# Patient Record
Sex: FEMALE | Race: OTHER | NOT HISPANIC OR LATINO | URBAN - METROPOLITAN AREA
[De-identification: names, ages, dates, MRNs, and addresses within clinical notes are randomized per-mention and may not be internally consistent; named-entity substitution may affect disease eponyms.]

---

## 2023-04-20 VITALS
RESPIRATION RATE: 16 BRPM | OXYGEN SATURATION: 96 % | SYSTOLIC BLOOD PRESSURE: 131 MMHG | HEART RATE: 61 BPM | WEIGHT: 216.05 LBS | TEMPERATURE: 97 F | HEIGHT: 65 IN | DIASTOLIC BLOOD PRESSURE: 80 MMHG

## 2023-04-20 RX ORDER — POVIDONE-IODINE 5 %
1 AEROSOL (ML) TOPICAL ONCE
Refills: 0 | Status: COMPLETED | OUTPATIENT
Start: 2023-04-21 | End: 2023-04-21

## 2023-04-20 NOTE — H&P ADULT - PROBLEM SELECTOR PLAN 1
Admit to Orthopaedic Service.  Presents today for elective right partial knee replacement   Pt medically stable and cleared for procedure today by Dr. Lion

## 2023-04-20 NOTE — H&P ADULT - NSHPLABSRESULTS_GEN_ALL_CORE
preop cbc/cmp/pt/inr/ptt - within normal limits, reviewed by medical clearance  ua +bacteria, urine culture negative   Cr 0.73   EKG bradycardia 46 BPM reviewed by medical clearance  Povidone iodine nasal swab to be given day of surgery

## 2023-04-20 NOTE — H&P ADULT - NSICDXPASTSURGICALHX_GEN_ALL_CORE_FT
PAST SURGICAL HISTORY:  H/O arthroscopy of knee B/L    Herniated cervical disc     History of left shoulder fracture

## 2023-04-20 NOTE — H&P ADULT - NSHPPHYSICALEXAM_GEN_ALL_CORE
MSK:  Decreased ROM right knee 2/2 pain   Remainder of physical exam as per medical clearance note Physical exam post op    Gen: 58 y/o, NAD  Dressing: c/d/i - YINA, ace, dirk stockings  Sensation intact to light touch bilateral lower extremities  Pulses: 2+ DP, brisk capillary refill  EHL/FHL/TA/GS 5/5 bilaterally

## 2023-04-20 NOTE — H&P ADULT - HISTORY OF PRESENT ILLNESS
57F with right knee pain  Presents for elective right partial knee replacement  57F with right knee pain x 5 years following a meniscal injury while lifting. Patient reports right knee pain is localized and anterior. Reports N/T in the back of her thighs intermittently, but denies N/T into right knee or right foot. Pt has tried injections, physical therapy, medications and time without relief of symptoms. Denies use of assistive device for ambulation. Denies hx of DM, stroke, MI, seizures, blood clots, denies taking blood thinners.     Presents for elective right partial knee replacement

## 2023-04-21 ENCOUNTER — INPATIENT (INPATIENT)
Facility: HOSPITAL | Age: 58
LOS: 2 days | Discharge: HOME | DRG: 470 | End: 2023-04-24
Attending: STUDENT IN AN ORGANIZED HEALTH CARE EDUCATION/TRAINING PROGRAM | Admitting: STUDENT IN AN ORGANIZED HEALTH CARE EDUCATION/TRAINING PROGRAM
Payer: COMMERCIAL

## 2023-04-21 DIAGNOSIS — Z98.890 OTHER SPECIFIED POSTPROCEDURAL STATES: Chronic | ICD-10-CM

## 2023-04-21 DIAGNOSIS — M17.11 UNILATERAL PRIMARY OSTEOARTHRITIS, RIGHT KNEE: ICD-10-CM

## 2023-04-21 DIAGNOSIS — E78.5 HYPERLIPIDEMIA, UNSPECIFIED: ICD-10-CM

## 2023-04-21 DIAGNOSIS — Z87.81 PERSONAL HISTORY OF (HEALED) TRAUMATIC FRACTURE: Chronic | ICD-10-CM

## 2023-04-21 DIAGNOSIS — M50.20 OTHER CERVICAL DISC DISPLACEMENT, UNSPECIFIED CERVICAL REGION: Chronic | ICD-10-CM

## 2023-04-21 PROCEDURE — 73560 X-RAY EXAM OF KNEE 1 OR 2: CPT | Mod: 26,RT

## 2023-04-21 DEVICE — TRIATHLON TIBIAL COMP SZ 3: Type: IMPLANTABLE DEVICE | Status: FUNCTIONAL

## 2023-04-21 DEVICE — INSERT TIB BEARING TRIATHLON CS X3 SZ 3 9MM: Type: IMPLANTABLE DEVICE | Status: FUNCTIONAL

## 2023-04-21 DEVICE — CEMENT SIMPLEX WITH TOBRAMYCIN: Type: IMPLANTABLE DEVICE | Status: FUNCTIONAL

## 2023-04-21 DEVICE — MAKO BONE PIN 3.2MM X 140MM: Type: IMPLANTABLE DEVICE | Status: FUNCTIONAL

## 2023-04-21 DEVICE — IMP PATELLA SYMMETRIC X3 33X9MM: Type: IMPLANTABLE DEVICE | Status: FUNCTIONAL

## 2023-04-21 DEVICE — MAKO BONE PIN 3.2MM X 110MM: Type: IMPLANTABLE DEVICE | Status: FUNCTIONAL

## 2023-04-21 DEVICE — COMP FEM TRIATHLON CR SZ3 RT: Type: IMPLANTABLE DEVICE | Status: FUNCTIONAL

## 2023-04-21 RX ORDER — SODIUM CHLORIDE 9 MG/ML
1000 INJECTION, SOLUTION INTRAVENOUS
Refills: 0 | Status: DISCONTINUED | OUTPATIENT
Start: 2023-04-21 | End: 2023-04-24

## 2023-04-21 RX ORDER — ACETAMINOPHEN 500 MG
975 TABLET ORAL EVERY 8 HOURS
Refills: 0 | Status: DISCONTINUED | OUTPATIENT
Start: 2023-04-21 | End: 2023-04-24

## 2023-04-21 RX ORDER — CHLORHEXIDINE GLUCONATE 213 G/1000ML
1 SOLUTION TOPICAL ONCE
Refills: 0 | Status: COMPLETED | OUTPATIENT
Start: 2023-04-21 | End: 2023-04-21

## 2023-04-21 RX ORDER — MAGNESIUM HYDROXIDE 400 MG/1
30 TABLET, CHEWABLE ORAL DAILY
Refills: 0 | Status: DISCONTINUED | OUTPATIENT
Start: 2023-04-21 | End: 2023-04-24

## 2023-04-21 RX ORDER — OXYCODONE HYDROCHLORIDE 5 MG/1
5 TABLET ORAL EVERY 4 HOURS
Refills: 0 | Status: DISCONTINUED | OUTPATIENT
Start: 2023-04-21 | End: 2023-04-24

## 2023-04-21 RX ORDER — ASPIRIN/CALCIUM CARB/MAGNESIUM 324 MG
325 TABLET ORAL EVERY 12 HOURS
Refills: 0 | Status: DISCONTINUED | OUTPATIENT
Start: 2023-04-22 | End: 2023-04-24

## 2023-04-21 RX ORDER — ONDANSETRON 8 MG/1
4 TABLET, FILM COATED ORAL EVERY 6 HOURS
Refills: 0 | Status: DISCONTINUED | OUTPATIENT
Start: 2023-04-21 | End: 2023-04-24

## 2023-04-21 RX ORDER — HYDROMORPHONE HYDROCHLORIDE 2 MG/ML
0.5 INJECTION INTRAMUSCULAR; INTRAVENOUS; SUBCUTANEOUS
Refills: 0 | Status: DISCONTINUED | OUTPATIENT
Start: 2023-04-21 | End: 2023-04-24

## 2023-04-21 RX ORDER — CELECOXIB 200 MG/1
400 CAPSULE ORAL ONCE
Refills: 0 | Status: COMPLETED | OUTPATIENT
Start: 2023-04-21 | End: 2023-04-21

## 2023-04-21 RX ORDER — POLYETHYLENE GLYCOL 3350 17 G/17G
17 POWDER, FOR SOLUTION ORAL AT BEDTIME
Refills: 0 | Status: DISCONTINUED | OUTPATIENT
Start: 2023-04-21 | End: 2023-04-24

## 2023-04-21 RX ORDER — OXYCODONE HYDROCHLORIDE 5 MG/1
20 TABLET ORAL ONCE
Refills: 0 | Status: DISCONTINUED | OUTPATIENT
Start: 2023-04-21 | End: 2023-04-21

## 2023-04-21 RX ORDER — OXYCODONE HYDROCHLORIDE 5 MG/1
10 TABLET ORAL EVERY 4 HOURS
Refills: 0 | Status: DISCONTINUED | OUTPATIENT
Start: 2023-04-21 | End: 2023-04-24

## 2023-04-21 RX ORDER — GABAPENTIN 400 MG/1
800 CAPSULE ORAL DAILY
Refills: 0 | Status: DISCONTINUED | OUTPATIENT
Start: 2023-04-21 | End: 2023-04-24

## 2023-04-21 RX ORDER — HYDROMORPHONE HYDROCHLORIDE 2 MG/ML
0.5 INJECTION INTRAMUSCULAR; INTRAVENOUS; SUBCUTANEOUS
Refills: 0 | Status: COMPLETED | OUTPATIENT
Start: 2023-04-21 | End: 2023-04-28

## 2023-04-21 RX ORDER — SENNA PLUS 8.6 MG/1
2 TABLET ORAL AT BEDTIME
Refills: 0 | Status: DISCONTINUED | OUTPATIENT
Start: 2023-04-21 | End: 2023-04-24

## 2023-04-21 RX ORDER — CEFAZOLIN SODIUM 1 G
2000 VIAL (EA) INJECTION EVERY 8 HOURS
Refills: 0 | Status: COMPLETED | OUTPATIENT
Start: 2023-04-21 | End: 2023-04-21

## 2023-04-21 RX ORDER — CELECOXIB 200 MG/1
200 CAPSULE ORAL EVERY 12 HOURS
Refills: 0 | Status: DISCONTINUED | OUTPATIENT
Start: 2023-04-22 | End: 2023-04-24

## 2023-04-21 RX ADMIN — POLYETHYLENE GLYCOL 3350 17 GRAM(S): 17 POWDER, FOR SOLUTION ORAL at 21:38

## 2023-04-21 RX ADMIN — Medication 100 MILLIGRAM(S): at 23:37

## 2023-04-21 RX ADMIN — Medication 975 MILLIGRAM(S): at 13:15

## 2023-04-21 RX ADMIN — OXYCODONE HYDROCHLORIDE 20 MILLIGRAM(S): 5 TABLET ORAL at 06:54

## 2023-04-21 RX ADMIN — SODIUM CHLORIDE 120 MILLILITER(S): 9 INJECTION, SOLUTION INTRAVENOUS at 13:31

## 2023-04-21 RX ADMIN — Medication 100 MILLIGRAM(S): at 16:22

## 2023-04-21 RX ADMIN — Medication 975 MILLIGRAM(S): at 22:38

## 2023-04-21 RX ADMIN — CHLORHEXIDINE GLUCONATE 1 APPLICATION(S): 213 SOLUTION TOPICAL at 06:20

## 2023-04-21 RX ADMIN — Medication 975 MILLIGRAM(S): at 21:38

## 2023-04-21 RX ADMIN — CELECOXIB 400 MILLIGRAM(S): 200 CAPSULE ORAL at 06:23

## 2023-04-21 RX ADMIN — Medication 1 APPLICATION(S): at 06:20

## 2023-04-21 RX ADMIN — HYDROMORPHONE HYDROCHLORIDE 0.5 MILLIGRAM(S): 2 INJECTION INTRAMUSCULAR; INTRAVENOUS; SUBCUTANEOUS at 12:15

## 2023-04-21 RX ADMIN — SENNA PLUS 2 TABLET(S): 8.6 TABLET ORAL at 21:38

## 2023-04-21 RX ADMIN — Medication 975 MILLIGRAM(S): at 14:15

## 2023-04-21 RX ADMIN — HYDROMORPHONE HYDROCHLORIDE 0.5 MILLIGRAM(S): 2 INJECTION INTRAMUSCULAR; INTRAVENOUS; SUBCUTANEOUS at 12:03

## 2023-04-21 NOTE — PACU DISCHARGE NOTE - NS MD DISCHARGE NOTE DISCHARGE
Floor Excisional Biopsy Additional Text (Leave Blank If You Do Not Want): The margin was drawn around the clinically apparent lesion. An elliptical shape was then drawn on the skin incorporating the lesion and margins.  Incisions were then made along these lines to the appropriate tissue plane and the lesion was extirpated.

## 2023-04-21 NOTE — PHYSICAL THERAPY INITIAL EVALUATION ADULT - GENERAL OBSERVATIONS, REHAB EVAL
PT IE completed. Patient ambulated 10 sidesteps with CG x1 and RW. Patient received semi supine in bed +tele, +RA, +heplock IV, +R knee dressing C/D/I, +(B) SCDs, NAD, willing to work with PT.

## 2023-04-21 NOTE — PHYSICAL THERAPY INITIAL EVALUATION ADULT - ADDITIONAL COMMENTS
Active community ambulator who lives with her 30 yo daughter in elevator access apartment, no MARCO ANTONIO, +ramp access to enter. Ambulates without AD and states she is independent with all ADLs prior. Of note, patient owns rollator for use.

## 2023-04-21 NOTE — PHYSICAL THERAPY INITIAL EVALUATION ADULT - GAIT DEVIATIONS NOTED, PT EVAL
decreased francia/increased time in double stance/decreased velocity of limb motion/decreased step length/decreased stride length/decreased weight-shifting ability

## 2023-04-21 NOTE — BRIEF OPERATIVE NOTE - NSICDXBRIEFPOSTOP_GEN_ALL_CORE_FT
POST-OP DIAGNOSIS:  Primary osteoarthritis of right knee 21-Apr-2023 12:01:33  Juliocesar Gonzáles

## 2023-04-21 NOTE — PRE-ANESTHESIA EVALUATION ADULT - NSANTHADDINFOFT_GEN_ALL_CORE
Spinal/ poss GA/ PNB for postop pain  R/B/A d/w patient including risks of parasthesia, HA, and nerve injury. All questions answered.

## 2023-04-21 NOTE — PHYSICAL THERAPY INITIAL EVALUATION ADULT - IMPAIRMENTS CONTRIBUTING IMPAIRED BED MOBILITY, REHAB EVAL
+lethargic/impaired balance/decreased flexibility/impaired postural control/decreased ROM/decreased strength

## 2023-04-21 NOTE — PHYSICAL THERAPY INITIAL EVALUATION ADULT - LEVEL OF INDEPENDENCE: SCOOT/BRIDGE, REHAB EVAL
Number Of Freeze-Thaw Cycles: 2 freeze-thaw cycles
Duration Of Freeze Thaw-Cycle (Seconds): 0
Render Post-Care Instructions In Note?: no
Consent: The patient's consent was obtained including but not limited to risks of crusting, scabbing, blistering, scarring, darker or lighter pigmentary change, recurrence, incomplete removal and infection.
Detail Level: Detailed
Post-Care Instructions: I reviewed with the patient in detail post-care instructions. Patient is to wear sunprotection, and avoid picking at any of the treated lesions. Pt may apply Vaseline to crusted or scabbing areas.
contact guard

## 2023-04-21 NOTE — PHYSICAL THERAPY INITIAL EVALUATION ADULT - MODALITIES TREATMENT COMMENTS
Supine therex: (B) ankle pumps, (B) glute sets, R quad sets, seated R knee flexion 1 set x 10 reps; HEP placed in chart and handout provided to patient.

## 2023-04-22 LAB
ANION GAP SERPL CALC-SCNC: 8 MMOL/L — SIGNIFICANT CHANGE UP (ref 5–17)
BUN SERPL-MCNC: 11 MG/DL — SIGNIFICANT CHANGE UP (ref 7–23)
CALCIUM SERPL-MCNC: 7.4 MG/DL — LOW (ref 8.4–10.5)
CHLORIDE SERPL-SCNC: 106 MMOL/L — SIGNIFICANT CHANGE UP (ref 96–108)
CO2 SERPL-SCNC: 28 MMOL/L — SIGNIFICANT CHANGE UP (ref 22–31)
CREAT SERPL-MCNC: 0.81 MG/DL — SIGNIFICANT CHANGE UP (ref 0.5–1.3)
EGFR: 84 ML/MIN/1.73M2 — SIGNIFICANT CHANGE UP
GLUCOSE SERPL-MCNC: 101 MG/DL — HIGH (ref 70–99)
HCT VFR BLD CALC: 33 % — LOW (ref 34.5–45)
HCV AB S/CO SERPL IA: 0.08 S/CO — SIGNIFICANT CHANGE UP (ref 0–0.99)
HCV AB SERPL-IMP: SIGNIFICANT CHANGE UP
HGB BLD-MCNC: 10.6 G/DL — LOW (ref 11.5–15.5)
MCHC RBC-ENTMCNC: 30.3 PG — SIGNIFICANT CHANGE UP (ref 27–34)
MCHC RBC-ENTMCNC: 32.1 GM/DL — SIGNIFICANT CHANGE UP (ref 32–36)
MCV RBC AUTO: 94.3 FL — SIGNIFICANT CHANGE UP (ref 80–100)
NRBC # BLD: 0 /100 WBCS — SIGNIFICANT CHANGE UP (ref 0–0)
PLATELET # BLD AUTO: 165 K/UL — SIGNIFICANT CHANGE UP (ref 150–400)
POTASSIUM SERPL-MCNC: 3.7 MMOL/L — SIGNIFICANT CHANGE UP (ref 3.5–5.3)
POTASSIUM SERPL-SCNC: 3.7 MMOL/L — SIGNIFICANT CHANGE UP (ref 3.5–5.3)
RBC # BLD: 3.5 M/UL — LOW (ref 3.8–5.2)
RBC # FLD: 12.3 % — SIGNIFICANT CHANGE UP (ref 10.3–14.5)
SODIUM SERPL-SCNC: 142 MMOL/L — SIGNIFICANT CHANGE UP (ref 135–145)
WBC # BLD: 7.17 K/UL — SIGNIFICANT CHANGE UP (ref 3.8–10.5)
WBC # FLD AUTO: 7.17 K/UL — SIGNIFICANT CHANGE UP (ref 3.8–10.5)

## 2023-04-22 RX ORDER — LANOLIN ALCOHOL/MO/W.PET/CERES
5 CREAM (GRAM) TOPICAL AT BEDTIME
Refills: 0 | Status: DISCONTINUED | OUTPATIENT
Start: 2023-04-22 | End: 2023-04-24

## 2023-04-22 RX ORDER — CELECOXIB 200 MG/1
1 CAPSULE ORAL
Qty: 28 | Refills: 0
Start: 2023-04-22 | End: 2023-05-05

## 2023-04-22 RX ORDER — PANTOPRAZOLE SODIUM 20 MG/1
1 TABLET, DELAYED RELEASE ORAL
Qty: 30 | Refills: 0
Start: 2023-04-22 | End: 2023-05-21

## 2023-04-22 RX ORDER — ACETAMINOPHEN 500 MG
3 TABLET ORAL
Qty: 63 | Refills: 0
Start: 2023-04-22 | End: 2023-04-28

## 2023-04-22 RX ORDER — SENNA PLUS 8.6 MG/1
2 TABLET ORAL
Qty: 14 | Refills: 0
Start: 2023-04-22 | End: 2023-04-28

## 2023-04-22 RX ORDER — PANTOPRAZOLE SODIUM 20 MG/1
40 TABLET, DELAYED RELEASE ORAL
Refills: 0 | Status: DISCONTINUED | OUTPATIENT
Start: 2023-04-22 | End: 2023-04-24

## 2023-04-22 RX ORDER — CELECOXIB 200 MG/1
1 CAPSULE ORAL
Refills: 0 | DISCHARGE

## 2023-04-22 RX ORDER — CALCIUM CARBONATE 500(1250)
1 TABLET ORAL THREE TIMES A DAY
Refills: 0 | Status: DISCONTINUED | OUTPATIENT
Start: 2023-04-22 | End: 2023-04-24

## 2023-04-22 RX ORDER — OXYCODONE HYDROCHLORIDE 5 MG/1
1 TABLET ORAL
Qty: 20 | Refills: 0
Start: 2023-04-22

## 2023-04-22 RX ORDER — ASPIRIN/CALCIUM CARB/MAGNESIUM 324 MG
1 TABLET ORAL
Qty: 60 | Refills: 0
Start: 2023-04-22 | End: 2023-05-21

## 2023-04-22 RX ADMIN — Medication 325 MILLIGRAM(S): at 05:33

## 2023-04-22 RX ADMIN — CELECOXIB 200 MILLIGRAM(S): 200 CAPSULE ORAL at 17:43

## 2023-04-22 RX ADMIN — GABAPENTIN 800 MILLIGRAM(S): 400 CAPSULE ORAL at 11:30

## 2023-04-22 RX ADMIN — Medication 975 MILLIGRAM(S): at 21:52

## 2023-04-22 RX ADMIN — Medication 975 MILLIGRAM(S): at 05:34

## 2023-04-22 RX ADMIN — CELECOXIB 200 MILLIGRAM(S): 200 CAPSULE ORAL at 06:34

## 2023-04-22 RX ADMIN — POLYETHYLENE GLYCOL 3350 17 GRAM(S): 17 POWDER, FOR SOLUTION ORAL at 21:53

## 2023-04-22 RX ADMIN — OXYCODONE HYDROCHLORIDE 5 MILLIGRAM(S): 5 TABLET ORAL at 12:13

## 2023-04-22 RX ADMIN — OXYCODONE HYDROCHLORIDE 5 MILLIGRAM(S): 5 TABLET ORAL at 18:34

## 2023-04-22 RX ADMIN — Medication 975 MILLIGRAM(S): at 22:52

## 2023-04-22 RX ADMIN — OXYCODONE HYDROCHLORIDE 5 MILLIGRAM(S): 5 TABLET ORAL at 11:30

## 2023-04-22 RX ADMIN — Medication 325 MILLIGRAM(S): at 17:43

## 2023-04-22 RX ADMIN — OXYCODONE HYDROCHLORIDE 5 MILLIGRAM(S): 5 TABLET ORAL at 23:21

## 2023-04-22 RX ADMIN — SENNA PLUS 2 TABLET(S): 8.6 TABLET ORAL at 21:52

## 2023-04-22 RX ADMIN — CELECOXIB 200 MILLIGRAM(S): 200 CAPSULE ORAL at 05:33

## 2023-04-22 RX ADMIN — Medication 975 MILLIGRAM(S): at 13:05

## 2023-04-22 RX ADMIN — OXYCODONE HYDROCHLORIDE 5 MILLIGRAM(S): 5 TABLET ORAL at 17:43

## 2023-04-22 RX ADMIN — Medication 975 MILLIGRAM(S): at 06:34

## 2023-04-22 NOTE — DISCHARGE NOTE PROVIDER - CARE PROVIDER_API CALL
Gunnar Cole; MBBS)  [facility] [employer] [facility]  [address] [place], [place] [address]  Phone: [phone]  Fax: [phone]  Follow Up Time: 2 weeks

## 2023-04-22 NOTE — DISCHARGE NOTE PROVIDER - HOSPITAL COURSE
Admitted 4/21/23 to Orthopedics Service  Surgery - Right TKR   Veronica-op Antibiotics  Pain control   DVT prophylaxis - Aspirin 325mg BID  OOB/Physical Therapy

## 2023-04-22 NOTE — DISCHARGE NOTE PROVIDER - NSDCFUADDINST_GEN_ALL_CORE_FT
Please follow Dr. Yanez**’s instruction sheets (IF APPLICABLE)   ACTIVITY:   - Weight bear as tolerated with assistive device. No strenuous activity, heavy lifting, driving or returning to work until cleared by MD.   - Apply a cold compress to the surgical site several times daily to reduce pain and swelling. For icing, twenty-minute sessions followed by an hour off is recommended. You should ice as frequently as possible. Ice should NEVER be placed directly on the skin. Wearing compression stockings during the first week after surgery can help reduce swelling in your knee, calf and foot, but is not required.      (KNEE REPLACEMENTS)   DO place a pillow under your heel when elevating the leg, to encourage full extension of the knee. Do NOT place a pillow behind your knee for comfort, as this can lead to permanent difficulty straightening your leg. It is normal to develop some swelling in the leg, ankle, and foot because of gravity.     (POSTERIOR HIP REPLACEMENTS)   Hip precautions:   The following precautions will remain in effect for 6 weeks following surgery:   · Do not cross your knees.   · Do not flex your hip (i.e., bring your knee toward your chest) beyond 90 degrees.   · Use a raised toilet seat. Do not use low chairs or couches.     · Sleep with a pillow between your knees.   · Do not reach down to  items on the floor.     (ANTERIOR HIP REPLACEMENTS)   Hip precautions:   · There are no specific range of motion precautions required with this approach to hip replacement.   · A raised toilet seat is not required, although you may find it easier to use one.   · You do not need to sleep with a pillow between your legs.     DRESSING/SHOWERING:   (AQUACEL – brown gel dressing)   - You may shower, your dressing is water-resistant. Do not soak in bathtubs. Remove dressing after postop day 7, then leave incision open to air. You may do this yourself (simply peel it off), or you may ask for assistance from your visiting nurse. Keep your incision clean and dry. Do not pick at your incision. Do not apply creams, ointments or oils to your incision until cleared by your surgeon. Do not soak your incision in sitting water (ie tubs, pools, lakes, etc.) until cleared by your surgeon. Do not scrub the incision – instead, allow soap and water to flow over the incision and then pat it dry with a clean towel.     (PREVENA or YINA – incisional wound vac)   - You have an incisional wound vac dressing with tubing to attached canister/battery pack. You may shower but must keep battery pack dry at all times. The battery dies in 7 days then can remove dressing and leave open to air. Keep your incision clean and dry. Do not pick at your incision. Do not apply creams, ointments or oils to your incision until cleared by your surgeon. Do not soak your incision in sitting water (ie tubs, pools, lakes, etc.) until cleared by your surgeon. Do not scrub the incision – instead, allow soap and water to flow over the incision and then pat it dry with a clean towel.     MEDICATION/ANTICOAGULATION:   -You have been prescribed Aspirin 325mg twice daily, as a preventative to help prevent postoperative blood clots. Please take this medication as prescribed.    - You have been prescribed medications for pain:     - Tylenol for mild to moderate pain. Do not exceed 3,000mg daily.     - For more severe pain, take Tylenol with the addition of narcotic pain medication. Take this medication as prescribed. This medication may cause drowsiness or dizziness. Do not operate machinery. This medication may cause constipation.   - For any additional medications, follow instructions on the bottle.    -Try to have regular bowel movements. Take stool softener or laxative if necessary. You may wish to take Miralax daily until you have regular bowel movements.    - If you have been prescribed Aspirin or an anti-inflammatory, please take prilosec (omeprazole) once a day, before breakfast, until no longer taking Aspirin or anti-inflammatory. This will help protect your stomach.   - If you have a pain management physician, please follow-up with them postoperatively.    - If you experience any negative side effects of your medications, please call your surgeon's office to discuss.      FOLLOW-UP:   - Call to schedule an appt with Dr. Cole for follow up.   - Please follow-up with your primary care physician or any other specialist you see postoperatively, if needed.    - Contact your doctor or go to the emergency room if you experience: fever greater than 101.5, chills, chest pain, difficulty breathing, redness or excessive drainage around the incision, other concerns.   ACTIVITY:   - Weight bear as tolerated with assistive device. No strenuous activity, heavy lifting, driving or returning to work until cleared by MD.   - Apply a cold compress to the surgical site several times daily to reduce pain and swelling. For icing, twenty-minute sessions followed by an hour off is recommended. You should ice as frequently as possible. Ice should NEVER be placed directly on the skin. Wearing compression stockings during the first week after surgery can help reduce swelling in your knee, calf and foot, but is not required.      (KNEE REPLACEMENTS)   DO place a pillow under your heel when elevating the leg, to encourage full extension of the knee. Do NOT place a pillow behind your knee for comfort, as this can lead to permanent difficulty straightening your leg. It is normal to develop some swelling in the leg, ankle, and foot because of gravity.     DRESSING/SHOWERING:   (YINA – incisional wound vac)   - You have an incisional wound vac dressing with tubing to attached canister/battery pack. You may shower but must keep battery pack dry at all times. The battery dies in 7 days then can remove dressing and leave open to air. Keep your incision clean and dry. Do not pick at your incision. Do not apply creams, ointments or oils to your incision until cleared by your surgeon. Do not soak your incision in sitting water (ie tubs, pools, lakes, etc.) until cleared by your surgeon. Do not scrub the incision – instead, allow soap and water to flow over the incision and then pat it dry with a clean towel.     MEDICATION/ANTICOAGULATION:   -You have been prescribed Aspirin 325mg twice daily, as a preventative to help prevent postoperative blood clots. Please take this medication as prescribed.    - You have been prescribed medications for pain:     - Tylenol for mild to moderate pain. Do not exceed 3,000mg daily.     - For more severe pain, take Tylenol with the addition of narcotic pain medication. Take this medication as prescribed. This medication may cause drowsiness or dizziness. Do not operate machinery. This medication may cause constipation.   - For any additional medications, follow instructions on the bottle.    -Try to have regular bowel movements. Take stool softener or laxative if necessary. You may wish to take Miralax daily until you have regular bowel movements.    - If you have been prescribed Aspirin or an anti-inflammatory, please take prilosec (omeprazole) once a day, before breakfast, until no longer taking Aspirin or anti-inflammatory. This will help protect your stomach.   - If you have a pain management physician, please follow-up with them postoperatively.    - If you experience any negative side effects of your medications, please call your surgeon's office to discuss.      FOLLOW-UP:   - Call to schedule an appt with Dr. Cole for follow up.   - Please follow-up with your primary care physician or any other specialist you see postoperatively, if needed.    - Contact your doctor or go to the emergency room if you experience: fever greater than 101.5, chills, chest pain, difficulty breathing, redness or excessive drainage around the incision, other concerns.

## 2023-04-22 NOTE — DISCHARGE NOTE PROVIDER - NSDCCPCAREPLAN_GEN_ALL_CORE_FT
PRINCIPAL DISCHARGE DIAGNOSIS  Diagnosis: Osteoarthritis of right knee  Assessment and Plan of Treatment:

## 2023-04-22 NOTE — DISCHARGE NOTE PROVIDER - NSDCMRMEDTOKEN_GEN_ALL_CORE_FT
CeleBREX 200 mg oral capsule: 1 orally once a day  gabapentin 800 mg oral tablet: 1 orally once a day   acetaminophen 325 mg oral tablet: 3 tab(s) orally every 8 hours  aspirin 325 mg oral tablet: 1 tab(s) orally every 12 hours  celecoxib 200 mg oral capsule: 1 cap(s) orally every 12 hours  gabapentin 800 mg oral tablet: 1 orally once a day  oxyCODONE 5 mg oral tablet: 1 tab(s) orally every 6 hours as needed for Moderate Pain (4 - 6) MDD: 4  pantoprazole 40 mg oral delayed release tablet: 1 tab(s) orally once a day  senna leaf extract oral tablet: 2 tab(s) orally once a day (at bedtime)

## 2023-04-23 LAB
ALBUMIN SERPL ELPH-MCNC: 3.2 G/DL — LOW (ref 3.3–5)
ALP SERPL-CCNC: 70 U/L — SIGNIFICANT CHANGE UP (ref 40–120)
ALT FLD-CCNC: 13 U/L — SIGNIFICANT CHANGE UP (ref 10–45)
ANION GAP SERPL CALC-SCNC: 8 MMOL/L — SIGNIFICANT CHANGE UP (ref 5–17)
AST SERPL-CCNC: 30 U/L — SIGNIFICANT CHANGE UP (ref 10–40)
BILIRUB DIRECT SERPL-MCNC: 0.2 MG/DL — SIGNIFICANT CHANGE UP (ref 0–0.3)
BILIRUB INDIRECT FLD-MCNC: 0 MG/DL — LOW (ref 0.2–1)
BILIRUB SERPL-MCNC: 0.2 MG/DL — SIGNIFICANT CHANGE UP (ref 0.2–1.2)
BUN SERPL-MCNC: 12 MG/DL — SIGNIFICANT CHANGE UP (ref 7–23)
CALCIUM SERPL-MCNC: 7.8 MG/DL — LOW (ref 8.4–10.5)
CHLORIDE SERPL-SCNC: 103 MMOL/L — SIGNIFICANT CHANGE UP (ref 96–108)
CO2 SERPL-SCNC: 28 MMOL/L — SIGNIFICANT CHANGE UP (ref 22–31)
CREAT SERPL-MCNC: 0.66 MG/DL — SIGNIFICANT CHANGE UP (ref 0.5–1.3)
EGFR: 101 ML/MIN/1.73M2 — SIGNIFICANT CHANGE UP
GLUCOSE SERPL-MCNC: 117 MG/DL — HIGH (ref 70–99)
HCT VFR BLD CALC: 31.7 % — LOW (ref 34.5–45)
HGB BLD-MCNC: 10.2 G/DL — LOW (ref 11.5–15.5)
MCHC RBC-ENTMCNC: 30.7 PG — SIGNIFICANT CHANGE UP (ref 27–34)
MCHC RBC-ENTMCNC: 32.2 GM/DL — SIGNIFICANT CHANGE UP (ref 32–36)
MCV RBC AUTO: 95.5 FL — SIGNIFICANT CHANGE UP (ref 80–100)
NRBC # BLD: 0 /100 WBCS — SIGNIFICANT CHANGE UP (ref 0–0)
PLATELET # BLD AUTO: 148 K/UL — LOW (ref 150–400)
POTASSIUM SERPL-MCNC: 4 MMOL/L — SIGNIFICANT CHANGE UP (ref 3.5–5.3)
POTASSIUM SERPL-SCNC: 4 MMOL/L — SIGNIFICANT CHANGE UP (ref 3.5–5.3)
PROT SERPL-MCNC: 5.5 G/DL — LOW (ref 6–8.3)
RBC # BLD: 3.32 M/UL — LOW (ref 3.8–5.2)
RBC # FLD: 12.4 % — SIGNIFICANT CHANGE UP (ref 10.3–14.5)
SODIUM SERPL-SCNC: 139 MMOL/L — SIGNIFICANT CHANGE UP (ref 135–145)
WBC # BLD: 6.38 K/UL — SIGNIFICANT CHANGE UP (ref 3.8–10.5)
WBC # FLD AUTO: 6.38 K/UL — SIGNIFICANT CHANGE UP (ref 3.8–10.5)

## 2023-04-23 PROCEDURE — 99253 IP/OBS CNSLTJ NEW/EST LOW 45: CPT

## 2023-04-23 PROCEDURE — 99221 1ST HOSP IP/OBS SF/LOW 40: CPT

## 2023-04-23 RX ADMIN — CELECOXIB 200 MILLIGRAM(S): 200 CAPSULE ORAL at 06:39

## 2023-04-23 RX ADMIN — Medication 325 MILLIGRAM(S): at 05:40

## 2023-04-23 RX ADMIN — CELECOXIB 200 MILLIGRAM(S): 200 CAPSULE ORAL at 05:39

## 2023-04-23 RX ADMIN — GABAPENTIN 800 MILLIGRAM(S): 400 CAPSULE ORAL at 12:05

## 2023-04-23 RX ADMIN — OXYCODONE HYDROCHLORIDE 10 MILLIGRAM(S): 5 TABLET ORAL at 18:43

## 2023-04-23 RX ADMIN — Medication 325 MILLIGRAM(S): at 17:43

## 2023-04-23 RX ADMIN — OXYCODONE HYDROCHLORIDE 10 MILLIGRAM(S): 5 TABLET ORAL at 10:19

## 2023-04-23 RX ADMIN — Medication 975 MILLIGRAM(S): at 05:39

## 2023-04-23 RX ADMIN — Medication 975 MILLIGRAM(S): at 06:39

## 2023-04-23 RX ADMIN — POLYETHYLENE GLYCOL 3350 17 GRAM(S): 17 POWDER, FOR SOLUTION ORAL at 22:21

## 2023-04-23 RX ADMIN — Medication 975 MILLIGRAM(S): at 17:43

## 2023-04-23 RX ADMIN — OXYCODONE HYDROCHLORIDE 5 MILLIGRAM(S): 5 TABLET ORAL at 00:21

## 2023-04-23 RX ADMIN — OXYCODONE HYDROCHLORIDE 10 MILLIGRAM(S): 5 TABLET ORAL at 17:43

## 2023-04-23 RX ADMIN — CELECOXIB 200 MILLIGRAM(S): 200 CAPSULE ORAL at 17:43

## 2023-04-23 RX ADMIN — PANTOPRAZOLE SODIUM 40 MILLIGRAM(S): 20 TABLET, DELAYED RELEASE ORAL at 05:39

## 2023-04-23 RX ADMIN — OXYCODONE HYDROCHLORIDE 10 MILLIGRAM(S): 5 TABLET ORAL at 11:19

## 2023-04-23 RX ADMIN — SENNA PLUS 2 TABLET(S): 8.6 TABLET ORAL at 22:21

## 2023-04-23 NOTE — CONSULT NOTE ADULT - SUBJECTIVE AND OBJECTIVE BOX
**Incomplete Note**  RICHARD BROOKLYN  HPI: 57F with right knee pain x 5 years following a meniscal injury while lifting. Patient reports right knee pain is localized and anterior. Reports N/T in the back of her thighs intermittently, but denies N/T into right knee or right foot. Pt has tried injections, physical therapy, medications and time without relief of symptoms. Denies use of assistive device for ambulation. Denies hx of DM, stroke, MI, seizures, blood clots, denies taking blood thinners.     Presents for elective right partial knee replacement       PAST MEDICAL/SURGICAL HISTORY  PAST MEDICAL & SURGICAL HISTORY:  HLD (hyperlipidemia)      History of left shoulder fracture      Herniated cervical disc      H/O arthroscopy of knee  B/L          REVIEW OF SYSTEMS:  CONSTITUTIONAL: No fever, weight loss, or fatigue  EYES: No eye pain, visual disturbances, or discharge  ENMT:  No difficulty hearing, tinnitus, vertigo; No sinus or throat pain  NECK: No pain or stiffness  BREASTS: No pain, masses, or nipple discharge  RESPIRATORY: No cough, wheezing, chills or hemoptysis; No shortness of breath  CARDIOVASCULAR: No chest pain, palpitations, dizziness, or leg swelling  GASTROINTESTINAL: No abdominal or epigastric pain. No nausea, vomiting, or hematemesis; No diarrhea or constipation. No melena or hematochezia.  GENITOURINARY: No dysuria, frequency, hematuria, or incontinence  NEUROLOGICAL: No headaches, memory loss, loss of strength, numbness, or tremors  SKIN: No itching, burning, rashes, or lesions   LYMPH NODES: No enlarged glands  ENDOCRINE: No heat or cold intolerance; No hair loss  MUSCULOSKELETAL: No joint pain or swelling; No muscle, back, or extremity pain  PSYCHIATRIC: No depression, anxiety, mood swings, or difficulty sleeping  HEME/LYMPH: No easy bruising, or bleeding gums  ALLERY AND IMMUNOLOGIC: No hives or eczema    T(C): 37.1 (04-23-23 @ 08:58), Max: 37.1 (04-22-23 @ 21:01)  HR: 63 (04-23-23 @ 08:58) (61 - 69)  BP: 112/75 (04-23-23 @ 08:58) (97/65 - 114/73)  RR: 18 (04-23-23 @ 08:58) (18 - 18)  SpO2: 96% (04-23-23 @ 08:58) (94% - 96%)  Wt(kg): --Vital Signs Last 24 Hrs  T(C): 37.1 (23 Apr 2023 08:58), Max: 37.1 (22 Apr 2023 21:01)  T(F): 98.7 (23 Apr 2023 08:58), Max: 98.8 (22 Apr 2023 21:01)  HR: 63 (23 Apr 2023 08:58) (61 - 69)  BP: 112/75 (23 Apr 2023 08:58) (97/65 - 114/73)  BP(mean): --  RR: 18 (23 Apr 2023 08:58) (18 - 18)  SpO2: 96% (23 Apr 2023 08:58) (94% - 96%)    Parameters below as of 23 Apr 2023 08:58  Patient On (Oxygen Delivery Method): room air        PHYSICAL EXAM:  GENERAL: NAD, well-groomed, well-developed  HEAD:  Atraumatic, Normocephalic  EYES: EOMI, PERRLA, conjunctiva and sclera clear  ENMT: No tonsillar erythema, exudates, or enlargement; Moist mucous membranes, Good dentition, No lesions  NECK: Supple, No JVD, Normal thyroid  NERVOUS SYSTEM:  Alert & Oriented X3, Good concentration; Motor Strength 5/5 B/L upper and lower extremities; DTRs 2+ intact and symmetric  CHEST/LUNG: Clear to percussion bilaterally; No rales, rhonchi, wheezing, or rubs  HEART: Regular rate and rhythm; No murmurs, rubs, or gallops  ABDOMEN: Soft, Nontender, Nondistended; Bowel sounds present  EXTREMITIES:  2+ Peripheral Pulses, No clubbing, cyanosis, or edema  LYMPH: No lymphadenopathy noted  SKIN: No rashes or lesions    Consultant(s) Notes Reviewed:  [x ] YES  [ ] NO  Care Discussed with Consultants/Other Providers [ x] YES  [ ] NO    LABS:  CBC   04-23-23 @ 06:10  Hematcorit 31.7  Hemoglobin 10.2  Mean Cell Hemoglobin 30.7  Platelet Count-Automated 148  RBC Count 3.32  Red Cell Distrib Width 12.4  Wbc Count 6.38      BMP  04-23-23 @ 06:10  Anion Gap. Serum 8  Blood Urea Nitrogen,Serm 12  Calcium, Total Serum 7.8  Carbon Dioxide, Serum 28  Chloride, Serum 103  Creatinine, Serum 0.66  eGFR in  --  eGFR in Non Afican American --  Gloucose, serum 117  Potassium, Serum 4.0  Sodium, Serum 139              04-22-23 @ 05:30  Anion Gap. Serum 8  Blood Urea Nitrogen,Serm 11  Calcium, Total Serum 7.4  Carbon Dioxide, Serum 28  Chloride, Serum 106  Creatinine, Serum 0.81  eGFR in  --  eGFR in Non Afican American --  Gloucose, serum 101  Potassium, Serum 3.7  Sodium, Serum 142                  CMP  04-23-23 @ 06:10  [place] Aminotransferase(ALT/SGPT)--  Albumin, Serum --  Alkaline Phosphatase, Serum --  Anion Gap, Serum 8  Aspartate Aminotransferase (AST/SGOT)--  Bilirubin Total, Serum --  Blood Urea Nitrogen, Serum 12  Calcium,Total Serum 7.8  Carbon Dioxide, Serum 28  Chloride, Serum 103  Creatinine, Serum 0.66  eGFR if  --  eGFR if Non African American --  Glucose, Serum 117  Potassium, Serum 4.0  Protein Total, Serum --  Sodium, Serum 139                      04-22-23 @ 05:30  Nicole Aminotransferase(ALT/SGPT)--  Albumin, Serum --  Alkaline Phosphatase, Serum --  Anion Gap, Serum 8  Aspartate Aminotransferase (AST/SGOT)--  Bilirubin Total, Serum --  Blood Urea Nitrogen, Serum 11  Calcium,Total Serum 7.4  Carbon Dioxide, Serum 28  Chloride, Serum 106  Creatinine, Serum 0.81  eGFR if  --  eGFR if Non African American --  Glucose, Serum 101  Potassium, Serum 3.7  Protein Total, Serum --  Sodium, Serum 142                          PT/INR      Amylase/Lipase            RADIOLOGY & ADDITIONAL TESTS:    Imaging Personally Reviewed:  [ ] YES  [ ] NO BROOKLYN RAMOS  HPI: 57F with right knee pain x 5 years following a meniscal injury while lifting. Patient reports right knee pain is localized and anterior. Reports N/T in the back of her thighs intermittently, but denies N/T into right knee or right foot. Pt has tried injections, physical therapy, medications and time without relief of symptoms. Denies use of assistive device for ambulation. Denies hx of DM, stroke, MI, seizures, blood clots, denies taking blood thinners.     Presents for elective right partial knee replacement     Pt seen and examined at bedside, NAD, no complaints at this time. ROS unremarkable       PAST MEDICAL/SURGICAL HISTORY  PAST MEDICAL & SURGICAL HISTORY:  HLD (hyperlipidemia)      History of left shoulder fracture      Herniated cervical disc      H/O arthroscopy of knee  B/L          REVIEW OF SYSTEMS:  CONSTITUTIONAL: No fever, weight loss, or fatigue  EYES: No eye pain, visual disturbances, or discharge  ENMT:  No difficulty hearing, tinnitus, vertigo; No sinus or throat pain  NECK: No pain or stiffness  BREASTS: No pain, masses, or nipple discharge  RESPIRATORY: No cough, wheezing, chills or hemoptysis; No shortness of breath  CARDIOVASCULAR: No chest pain, palpitations, dizziness, or leg swelling  GASTROINTESTINAL: No abdominal or epigastric pain. No nausea, vomiting, or hematemesis; No diarrhea or constipation. No melena or hematochezia.  GENITOURINARY: No dysuria, frequency, hematuria, or incontinence  NEUROLOGICAL: No headaches, memory loss, loss of strength, numbness, or tremors  SKIN: No itching, burning, rashes, or lesions   LYMPH NODES: No enlarged glands  ENDOCRINE: No heat or cold intolerance; No hair loss  MUSCULOSKELETAL: No joint pain or swelling; No muscle, back, or extremity pain  PSYCHIATRIC: No depression, anxiety, mood swings, or difficulty sleeping  HEME/LYMPH: No easy bruising, or bleeding gums  ALLERY AND IMMUNOLOGIC: No hives or eczema    T(C): 37.1 (04-23-23 @ 08:58), Max: 37.1 (04-22-23 @ 21:01)  HR: 63 (04-23-23 @ 08:58) (61 - 69)  BP: 112/75 (04-23-23 @ 08:58) (97/65 - 114/73)  RR: 18 (04-23-23 @ 08:58) (18 - 18)  SpO2: 96% (04-23-23 @ 08:58) (94% - 96%)  Wt(kg): --Vital Signs Last 24 Hrs  T(C): 37.1 (23 Apr 2023 08:58), Max: 37.1 (22 Apr 2023 21:01)  T(F): 98.7 (23 Apr 2023 08:58), Max: 98.8 (22 Apr 2023 21:01)  HR: 63 (23 Apr 2023 08:58) (61 - 69)  BP: 112/75 (23 Apr 2023 08:58) (97/65 - 114/73)  BP(mean): --  RR: 18 (23 Apr 2023 08:58) (18 - 18)  SpO2: 96% (23 Apr 2023 08:58) (94% - 96%)    Parameters below as of 23 Apr 2023 08:58  Patient On (Oxygen Delivery Method): room air        PHYSICAL EXAM:  GENERAL: NAD, well-groomed, well-developed  HEAD:  Atraumatic, Normocephalic  EYES: EOMI, PERRLA, conjunctiva and sclera clear  ENMT: No tonsillar erythema, exudates, or enlargement; Moist mucous membranes, Good dentition, No lesions  NECK: Supple, No JVD, Normal thyroid  NERVOUS SYSTEM:  Alert & Oriented X3, Good concentration; Motor Strength 5/5 B/L upper and lower extremities; DTRs 2+ intact and symmetric  CHEST/LUNG: Clear to percussion bilaterally; No rales, rhonchi, wheezing, or rubs  HEART: Regular rate and rhythm; No murmurs, rubs, or gallops  ABDOMEN: Soft, Nontender, Nondistended; Bowel sounds present  EXTREMITIES:  right LE wrapped in ACE bandages w/supporting brace (CDI), 2+ Peripheral Pulses, No clubbing, cyanosis, or edema  LYMPH: No lymphadenopathy noted  SKIN: No rashes or lesions    Consultant(s) Notes Reviewed:  [x ] YES  [ ] NO  Care Discussed with Consultants/Other Providers [ x] YES  [ ] NO    LABS:  CBC   04-23-23 @ 06:10  Hematcorit 31.7  Hemoglobin 10.2  Mean Cell Hemoglobin 30.7  Platelet Count-Automated 148  RBC Count 3.32  Red Cell Distrib Width 12.4  Wbc Count 6.38      BMP  04-23-23 @ 06:10  Anion Gap. Serum 8  Blood Urea Nitrogen,Serm 12  Calcium, Total Serum 7.8  Carbon Dioxide, Serum 28  Chloride, Serum 103  Creatinine, Serum 0.66  eGFR in  --  eGFR in Non Afican American --  Gloucose, serum 117  Potassium, Serum 4.0  Sodium, Serum 139              04-22-23 @ 05:30  Anion Gap. Serum 8  Blood Urea Nitrogen,Serm 11  Calcium, Total Serum 7.4  Carbon Dioxide, Serum 28  Chloride, Serum 106  Creatinine, Serum 0.81  eGFR in  --  eGFR in Non Afican American --  Gloucose, serum 101  Potassium, Serum 3.7  Sodium, Serum 142                  CMP  04-23-23 @ 06:10  Nicole Aminotransferase(ALT/SGPT)--  Albumin, Serum --  Alkaline Phosphatase, Serum --  Anion Gap, Serum 8  Aspartate Aminotransferase (AST/SGOT)--  Bilirubin Total, Serum --  Blood Urea Nitrogen, Serum 12  Calcium,Total Serum 7.8  Carbon Dioxide, Serum 28  Chloride, Serum 103  Creatinine, Serum 0.66  eGFR if  --  eGFR if Non African American --  Glucose, Serum 117  Potassium, Serum 4.0  Protein Total, Serum --  Sodium, Serum 139                      04-22-23 @ 05:30  Nicole Aminotransferase(ALT/SGPT)--  Albumin, Serum --  Alkaline Phosphatase, Serum --  Anion Gap, Serum 8  Aspartate Aminotransferase (AST/SGOT)--  Bilirubin Total, Serum --  Blood Urea Nitrogen, Serum 11  Calcium,Total Serum 7.4  Carbon Dioxide, Serum 28  Chloride, Serum 106  Creatinine, Serum 0.81  eGFR if  --  eGFR if Non African American --  Glucose, Serum 101  Potassium, Serum 3.7  Protein Total, Serum --  Sodium, Serum 142                          PT/INR      Amylase/Lipase            RADIOLOGY & ADDITIONAL TESTS:    Imaging Personally Reviewed:  [ ] YES  [ ] NO BROOKLYN RAMOS  57F pmhx lumbar stenosis w/radiculopathy p/w right knee pain x 5 year following a meniscal injury while lifting. Patient reports right knee pain is localized and anterior. Reports N/T in the back of her thighs intermittently, but denies N/T into right knee or right foot. Pt has tried injections, physical therapy, medications and time without relief of symptoms. Denies use of assistive device for ambulation. Denies hx of DM, stroke, MI, seizures, blood clots, denies taking blood thinners.     Presents for elective right partial knee replacement     Pt seen and examined at bedside, NAD, no complaints at this time. ROS unremarkable       PAST MEDICAL/SURGICAL HISTORY  PAST MEDICAL & SURGICAL HISTORY:  HLD (hyperlipidemia)      History of left shoulder fracture      Herniated cervical disc      H/O arthroscopy of knee  B/L          REVIEW OF SYSTEMS:  CONSTITUTIONAL: No fever, weight loss, or fatigue  EYES: No eye pain, visual disturbances, or discharge  ENMT:  No difficulty hearing, tinnitus, vertigo; No sinus or throat pain  NECK: No pain or stiffness  BREASTS: No pain, masses, or nipple discharge  RESPIRATORY: No cough, wheezing, chills or hemoptysis; No shortness of breath  CARDIOVASCULAR: No chest pain, palpitations, dizziness, or leg swelling  GASTROINTESTINAL: No abdominal or epigastric pain. No nausea, vomiting, or hematemesis; No diarrhea or constipation. No melena or hematochezia.  GENITOURINARY: No dysuria, frequency, hematuria, or incontinence  NEUROLOGICAL: No headaches, memory loss, loss of strength, numbness, or tremors  SKIN: No itching, burning, rashes, or lesions   LYMPH NODES: No enlarged glands  ENDOCRINE: No heat or cold intolerance; No hair loss  MUSCULOSKELETAL: No joint pain or swelling; No muscle, back, or extremity pain  PSYCHIATRIC: No depression, anxiety, mood swings, or difficulty sleeping  HEME/LYMPH: No easy bruising, or bleeding gums  ALLERY AND IMMUNOLOGIC: No hives or eczema    T(C): 37.1 (04-23-23 @ 08:58), Max: 37.1 (04-22-23 @ 21:01)  HR: 63 (04-23-23 @ 08:58) (61 - 69)  BP: 112/75 (04-23-23 @ 08:58) (97/65 - 114/73)  RR: 18 (04-23-23 @ 08:58) (18 - 18)  SpO2: 96% (04-23-23 @ 08:58) (94% - 96%)  Wt(kg): --Vital Signs Last 24 Hrs  T(C): 37.1 (23 Apr 2023 08:58), Max: 37.1 (22 Apr 2023 21:01)  T(F): 98.7 (23 Apr 2023 08:58), Max: 98.8 (22 Apr 2023 21:01)  HR: 63 (23 Apr 2023 08:58) (61 - 69)  BP: 112/75 (23 Apr 2023 08:58) (97/65 - 114/73)  BP(mean): --  RR: 18 (23 Apr 2023 08:58) (18 - 18)  SpO2: 96% (23 Apr 2023 08:58) (94% - 96%)    Parameters below as of 23 Apr 2023 08:58  Patient On (Oxygen Delivery Method): room air        PHYSICAL EXAM:  GENERAL: NAD, well-groomed, well-developed  HEAD:  Atraumatic, Normocephalic  EYES: EOMI, PERRLA, conjunctiva and sclera clear  ENMT: No tonsillar erythema, exudates, or enlargement; Moist mucous membranes, Good dentition, No lesions  NECK: Supple, No JVD, Normal thyroid  NERVOUS SYSTEM:  Alert & Oriented X3, Good concentration; Motor Strength 5/5 B/L upper and lower extremities; DTRs 2+ intact and symmetric  CHEST/LUNG: Clear to percussion bilaterally; No rales, rhonchi, wheezing, or rubs  HEART: Regular rate and rhythm; No murmurs, rubs, or gallops  ABDOMEN: Soft, Nontender, Nondistended; Bowel sounds present  EXTREMITIES:  right LE wrapped in ACE bandages w/supporting brace (CDI), 2+ Peripheral Pulses, No clubbing, cyanosis, or edema  LYMPH: No lymphadenopathy noted  SKIN: No rashes or lesions    Consultant(s) Notes Reviewed:  [x ] YES  [ ] NO  Care Discussed with Consultants/Other Providers [ x] YES  [ ] NO    LABS:  CBC   04-23-23 @ 06:10  Hematcorit 31.7  Hemoglobin 10.2  Mean Cell Hemoglobin 30.7  Platelet Count-Automated 148  RBC Count 3.32  Red Cell Distrib Width 12.4  Wbc Count 6.38      BMP  04-23-23 @ 06:10  Anion Gap. Serum 8  Blood Urea Nitrogen,Serm 12  Calcium, Total Serum 7.8  Carbon Dioxide, Serum 28  Chloride, Serum 103  Creatinine, Serum 0.66  eGFR in  --  eGFR in Non Afican American --  Gloucose, serum 117  Potassium, Serum 4.0  Sodium, Serum 139              04-22-23 @ 05:30  Anion Gap. Serum 8  Blood Urea Nitrogen,Serm 11  Calcium, Total Serum 7.4  Carbon Dioxide, Serum 28  Chloride, Serum 106  Creatinine, Serum 0.81  eGFR in  --  eGFR in Non Afican American --  Gloucose, serum 101  Potassium, Serum 3.7  Sodium, Serum 142                  CMP  04-23-23 @ 06:10  Nicole Aminotransferase(ALT/SGPT)--  Albumin, Serum --  Alkaline Phosphatase, Serum --  Anion Gap, Serum 8  Aspartate Aminotransferase (AST/SGOT)--  Bilirubin Total, Serum --  Blood Urea Nitrogen, Serum 12  Calcium,Total Serum 7.8  Carbon Dioxide, Serum 28  Chloride, Serum 103  Creatinine, Serum 0.66  eGFR if  --  eGFR if Non African American --  Glucose, Serum 117  Potassium, Serum 4.0  Protein Total, Serum --  Sodium, Serum 139                      04-22-23 @ 05:30  Nicole Aminotransferase(ALT/SGPT)--  Albumin, Serum --  Alkaline Phosphatase, Serum --  Anion Gap, Serum 8  Aspartate Aminotransferase (AST/SGOT)--  Bilirubin Total, Serum --  Blood Urea Nitrogen, Serum 11  Calcium,Total Serum 7.4  Carbon Dioxide, Serum 28  Chloride, Serum 106  Creatinine, Serum 0.81  eGFR if  --  eGFR if Non African American --  Glucose, Serum 101  Potassium, Serum 3.7  Protein Total, Serum --  Sodium, Serum 142                          PT/INR      Amylase/Lipase            RADIOLOGY & ADDITIONAL TESTS:    Imaging Personally Reviewed:  [ ] YES  [ ] NO

## 2023-04-23 NOTE — CONSULT NOTE ADULT - ASSESSMENT
57F with right knee pain x 5 years following a meniscal injury while lifting presents to ortho service for right partial knee replacement now POD 1. Medicine comgmt consulted for remainder of care.    Recommend:  #Neuro-PATRICE  #Cardiovascular-PATRICE  #Pulmonary-PATRICE  #GI-PATRICE  #Renal-PATRICE  #Endocrine-PATRICE  #Heme/Onc- Noromcytic anemia: please obtain b12, folate, ferritin, active t/s, transfuse if hgb <7  #ID-PATRICE  #MSK-PATRICE    Dispo: pending PT    Plan discussed with primary team, thank you     57F with right knee pain x 5 years following a meniscal injury while lifting presents to ortho service for right partial knee replacement now POD 1. Medicine comgmt consulted for remainder of care.    Recommend:  #Neuro-PATRICE  #Cardiovascular-PATRICE  #Pulmonary-PATRICE  #GI-PATRICE  #Renal-PATRICE  #Endocrine-PATRICE  #Heme/Onc- Normocytic anemia: please obtain b12/folate if staying inhospital, ferritin, active t/s, transfuse if hgb <7  #ID-PATRICE  #MSK-PATRICE    Dispo: pending PT    Plan discussed with primary team, thank you     57F pmhx lumbar stenosis w/radiuculopathy p/w right knee pain x 5 years following a meniscal injury while lifting presents to ortho service for right partial knee replacement now POD 1. Medicine comgmt consulted for remainder of care.    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

## 2023-04-24 VITALS
TEMPERATURE: 98 F | DIASTOLIC BLOOD PRESSURE: 72 MMHG | SYSTOLIC BLOOD PRESSURE: 109 MMHG | HEART RATE: 74 BPM | OXYGEN SATURATION: 94 % | RESPIRATION RATE: 17 BRPM

## 2023-04-24 LAB
ANION GAP SERPL CALC-SCNC: 9 MMOL/L — SIGNIFICANT CHANGE UP (ref 5–17)
BUN SERPL-MCNC: 13 MG/DL — SIGNIFICANT CHANGE UP (ref 7–23)
CALCIUM SERPL-MCNC: 7.6 MG/DL — LOW (ref 8.4–10.5)
CHLORIDE SERPL-SCNC: 105 MMOL/L — SIGNIFICANT CHANGE UP (ref 96–108)
CO2 SERPL-SCNC: 27 MMOL/L — SIGNIFICANT CHANGE UP (ref 22–31)
CREAT SERPL-MCNC: 0.67 MG/DL — SIGNIFICANT CHANGE UP (ref 0.5–1.3)
EGFR: 101 ML/MIN/1.73M2 — SIGNIFICANT CHANGE UP
GLUCOSE SERPL-MCNC: 102 MG/DL — HIGH (ref 70–99)
HCT VFR BLD CALC: 30.1 % — LOW (ref 34.5–45)
HGB BLD-MCNC: 9.9 G/DL — LOW (ref 11.5–15.5)
MCHC RBC-ENTMCNC: 31.1 PG — SIGNIFICANT CHANGE UP (ref 27–34)
MCHC RBC-ENTMCNC: 32.9 GM/DL — SIGNIFICANT CHANGE UP (ref 32–36)
MCV RBC AUTO: 94.7 FL — SIGNIFICANT CHANGE UP (ref 80–100)
NRBC # BLD: 0 /100 WBCS — SIGNIFICANT CHANGE UP (ref 0–0)
PLATELET # BLD AUTO: 154 K/UL — SIGNIFICANT CHANGE UP (ref 150–400)
POTASSIUM SERPL-MCNC: 3.9 MMOL/L — SIGNIFICANT CHANGE UP (ref 3.5–5.3)
POTASSIUM SERPL-SCNC: 3.9 MMOL/L — SIGNIFICANT CHANGE UP (ref 3.5–5.3)
RBC # BLD: 3.18 M/UL — LOW (ref 3.8–5.2)
RBC # FLD: 12.5 % — SIGNIFICANT CHANGE UP (ref 10.3–14.5)
SODIUM SERPL-SCNC: 141 MMOL/L — SIGNIFICANT CHANGE UP (ref 135–145)
WBC # BLD: 6.29 K/UL — SIGNIFICANT CHANGE UP (ref 3.8–10.5)
WBC # FLD AUTO: 6.29 K/UL — SIGNIFICANT CHANGE UP (ref 3.8–10.5)

## 2023-04-24 PROCEDURE — 86803 HEPATITIS C AB TEST: CPT

## 2023-04-24 PROCEDURE — 73560 X-RAY EXAM OF KNEE 1 OR 2: CPT

## 2023-04-24 PROCEDURE — 97116 GAIT TRAINING THERAPY: CPT

## 2023-04-24 PROCEDURE — 97161 PT EVAL LOW COMPLEX 20 MIN: CPT

## 2023-04-24 PROCEDURE — 97110 THERAPEUTIC EXERCISES: CPT

## 2023-04-24 PROCEDURE — 99232 SBSQ HOSP IP/OBS MODERATE 35: CPT

## 2023-04-24 PROCEDURE — 85027 COMPLETE CBC AUTOMATED: CPT

## 2023-04-24 PROCEDURE — C1713: CPT

## 2023-04-24 PROCEDURE — 80076 HEPATIC FUNCTION PANEL: CPT

## 2023-04-24 PROCEDURE — 36415 COLL VENOUS BLD VENIPUNCTURE: CPT

## 2023-04-24 PROCEDURE — S2900: CPT

## 2023-04-24 PROCEDURE — C1776: CPT

## 2023-04-24 PROCEDURE — 80048 BASIC METABOLIC PNL TOTAL CA: CPT

## 2023-04-24 RX ADMIN — OXYCODONE HYDROCHLORIDE 5 MILLIGRAM(S): 5 TABLET ORAL at 11:10

## 2023-04-24 RX ADMIN — CELECOXIB 200 MILLIGRAM(S): 200 CAPSULE ORAL at 06:12

## 2023-04-24 RX ADMIN — Medication 325 MILLIGRAM(S): at 06:12

## 2023-04-24 RX ADMIN — OXYCODONE HYDROCHLORIDE 5 MILLIGRAM(S): 5 TABLET ORAL at 06:17

## 2023-04-24 RX ADMIN — PANTOPRAZOLE SODIUM 40 MILLIGRAM(S): 20 TABLET, DELAYED RELEASE ORAL at 06:12

## 2023-04-24 NOTE — PROGRESS NOTE ADULT - SUBJECTIVE AND OBJECTIVE BOX
Patient is a 57y old  Female who presents with a chief complaint of right knee pain (23 Apr 2023 09:56)        SUBJECTIVE:  Patient was seen and examined at bedside.    Overnight Events :  feeling better , right knee pain       Review of systems: 12 point Review of systems negative unless otherwise documented elsewhere in note.     Diet, Regular (04-21-23 @ 13:13) [Active]  Diet, Clear Liquid (04-21-23 @ 07:32) [Available for Activation]      MEDICATIONS:  MEDICATIONS  (STANDING):  acetaminophen     Tablet .. 975 milliGRAM(s) Oral every 8 hours  aspirin 325 milliGRAM(s) Oral every 12 hours  celecoxib 200 milliGRAM(s) Oral every 12 hours  gabapentin 800 milliGRAM(s) Oral daily  lactated ringers. 1000 milliLiter(s) (120 mL/Hr) IV Continuous <Continuous>  pantoprazole    Tablet 40 milliGRAM(s) Oral before breakfast  polyethylene glycol 3350 17 Gram(s) Oral at bedtime  senna 2 Tablet(s) Oral at bedtime    MEDICATIONS  (PRN):  calcium carbonate    500 mG (Tums) Chewable 1 Tablet(s) Chew three times a day PRN Heartburn  HYDROmorphone  Injectable 0.5 milliGRAM(s) IV Push every 5 minutes PRN breakthrough pain  magnesium hydroxide Suspension 30 milliLiter(s) Oral daily PRN Constipation  melatonin 5 milliGRAM(s) Oral at bedtime PRN Sleep  ondansetron Injectable 4 milliGRAM(s) IV Push every 6 hours PRN Nausea and/or Vomiting  oxyCODONE    IR 5 milliGRAM(s) Oral every 4 hours PRN Moderate Pain (4 - 6)  oxyCODONE    IR 10 milliGRAM(s) Oral every 4 hours PRN Severe Pain (7 - 10)      Allergies    No Known Allergies    Intolerances        OBJECTIVE:  Vital Signs Last 24 Hrs  T(C): 36.6 (24 Apr 2023 05:10), Max: 37.2 (23 Apr 2023 16:31)  T(F): 97.9 (24 Apr 2023 05:10), Max: 98.9 (23 Apr 2023 16:31)  HR: 74 (24 Apr 2023 05:10) (67 - 74)  BP: 109/72 (24 Apr 2023 05:10) (109/72 - 115/78)  BP(mean): --  RR: 17 (24 Apr 2023 05:10) (17 - 18)  SpO2: 94% (24 Apr 2023 05:10) (94% - 96%)    Parameters below as of 24 Apr 2023 05:10  Patient On (Oxygen Delivery Method): room air      I&O's Summary      PHYSICAL EXAM:  Gen: Resting in bed at time of exam, not in distress   HEENT: moist mucosa, no lesions   Neck: supple, trachea at midline  CV: RRR, +S1/S2  Pulm: no wheezing , no crackles  no increase in work of breathing  Abd: soft, NTND  Skin: warm and dry, no new rashes   Ext: moving all 4 extremities spontaneously , no edema  ,  Neuro: AOx3, no gross focal neurological deficits  Psych: affect and behavior appropriate, pleasant at time of interview    LABS:                        9.9    6.29  )-----------( 154      ( 24 Apr 2023 06:45 )             30.1     04-24    141  |  105  |  13  ----------------------------<  102<H>  3.9   |  27  |  0.67    Ca    7.6<L>      24 Apr 2023 06:45    TPro  5.5<L>  /  Alb  3.2<L>  /  TBili  0.2  /  DBili  0.2  /  AST  30  /  ALT  13  /  AlkPhos  70  04-23    LIVER FUNCTIONS - ( 23 Apr 2023 06:10 )  Alb: 3.2 g/dL / Pro: 5.5 g/dL / ALK PHOS: 70 U/L / ALT: 13 U/L / AST: 30 U/L / GGT: x             CAPILLARY BLOOD GLUCOSE            MICRODATA:      RADIOLOGY/OTHER STUDIES:            
Ortho Note    Patient seen and examined at bedside. Pt comfortable without complaints, pain controlled. Patient ready to go home. Voiding freely and passing flatus.   Denies CP, SOB, N/V, new numbness/tingling     Vital Signs Last 24 Hrs  T(C): 36.6 (04-24-23 @ 05:10), Max: 36.6 (04-24-23 @ 05:10)  T(F): 97.9 (04-24-23 @ 05:10), Max: 97.9 (04-24-23 @ 05:10)  HR: 74 (04-24-23 @ 05:10) (74 - 74)  BP: 109/72 (04-24-23 @ 05:10) (109/72 - 109/72)  BP(mean): --  RR: 17 (04-24-23 @ 05:10) (17 - 17)  SpO2: 94% (04-24-23 @ 05:10) (94% - 94%)  I&O's Summary      General: Pt Alert and oriented, NAD  DSG C/D/I- YINA to right knee with overlying ANGELINA stockings and cryocuff  Pulses: 2+ DP pulses palpable bilaterally. Skin wwp. Cap refill brisk.  Sensation: SILT to bilateral lower extremites  Motor: EHL/FHL/TA/GS 5/5 bilaterally                          9.9    6.29  )-----------( 154      ( 24 Apr 2023 06:45 )             30.1     04-24    141  |  105  |  13  ----------------------------<  102<H>  3.9   |  27  |  0.67    Ca    7.6<L>      24 Apr 2023 06:45    TPro  5.5<L>  /  Alb  3.2<L>  /  TBili  0.2  /  DBili  0.2  /  AST  30  /  ALT  13  /  AlkPhos  70  04-23      A/P: 56yo Female POD#3 s/p Right TKR   - Stable  - Pain Control  - OOB/IS  - Bowel Regimen  - DVT ppx: ASA 325mg BID  - PT, WBS: WBAT  - Dispo: HPT, discharge home today (patient has ride coming at 11am)    Ortho Pager [phone]
ortho note     Procedure: right total knee replacement  Surgeon: Dr. Cole    Pt comfortable without complaints, pain controlled  Denies CP, SOB, N/V, numbness/tingling     Vital Signs Last 24 Hrs  T(C): 36.7 (22 Apr 2023 00:11), Max: 37 (21 Apr 2023 17:35)  T(F): 98.1 (22 Apr 2023 00:11), Max: 98.6 (21 Apr 2023 17:35)  HR: 59 (22 Apr 2023 00:11) (59 - 96)  BP: 109/58 (22 Apr 2023 00:11) (106/83 - 140/85)  BP(mean): 100 (21 Apr 2023 16:00) (89 - 111)  RR: 18 (22 Apr 2023 00:11) (8 - 24)  SpO2: 92% (22 Apr 2023 00:11) (86% - 100%)    Parameters below as of 22 Apr 2023 00:11  Patient On (Oxygen Delivery Method): room air        PE:  General: Pt Alert and oriented, NAD  DSG: Jocelin, dirk and ace bandage C/D/I  Pulses: + 2 DP palpable, <3 cap refill  Sensation: sensation intact to light touch BLE  Motor: 5/5 EHL/FHL/TA/GS BLE    Post-op X-Ray:   Xray Knee 1 or 2 Views, Right (04.21.23 @ 11:57)   ACC: [id number] EXAM:  XR KNEE 1-2 VIEWS RT   ORDERED BY: SHELLY BARAJAS   PROCEDURE DATE:  04/21/2023      A/P: 57yFemale s/p right total knee replacement on 4/21   - VSStable  - Pain Control  - DVT ppx: ASA 325mg BID start on 4/22 @6AM  - Post op abx: Ancef  - PT, WBS: WBAT   Dispo: [facility]     Ortho Pager [phone]
Ortho Post Op Check    Procedure: right total knee replacement  Surgeon: Dr. Cole    Pt comfortable without complaints resting in bed in the PACU, pain controlled  Denies CP, SOB, N/V, numbness/tingling     Vital Signs Last 24 Hrs  T(C): 36.5 (04-21-23 @ 11:45), Max: 36.5 (04-21-23 @ 11:45)  T(F): 97.7 (04-21-23 @ 11:45), Max: 97.7 (04-21-23 @ 11:45)  HR: 66 (04-21-23 @ 13:00) (62 - 82)  BP: 124/83 (04-21-23 @ 13:00) (124/83 - 140/85)  BP(mean): 99 (04-21-23 @ 13:00) (96 - 111)  RR: 16 (04-21-23 @ 13:00) (8 - 16)  SpO2: 98% (04-21-23 @ 13:00) (86% - 100%)  I&O's Summary    21 Apr 2023 07:01  -  21 Apr 2023 13:33  --------------------------------------------------------  IN: 2120 mL / OUT: 200 mL / NET: 1920 mL      PE:  General: Pt Alert and oriented, NAD  DSG: Jocelin, dirk and ace bandage C/D/I  Pulses: + 2 DP palpable, <3 cap refill  Sensation: sensation intact to light touch BLE  Motor: 5/5 EHL/FHL/TA/GS BLE    Post-op X-Ray:   Xray Knee 1 or 2 Views, Right (04.21.23 @ 11:57)   ACC: [id number] EXAM:  XR KNEE 1-2 VIEWS RT   ORDERED BY: SHELLY BARAJAS   PROCEDURE DATE:  04/21/2023      INTERPRETATION:  CLINICAL HISTORY: 57-year-old post arthroplasty.    IMPRESSION: Frontal and lateral views of the knee demonstrates total   arthroplasty in appropriate alignment with expected air and fluid within   the joint.    A/P: 57yFemale POD#0 s/p right total knee replacement  - VSStable  - Pain Control  - DVT ppx: ASA 325mg BID start on 4/22 @6AM  - Post op abx: Ancef  - PT, WBS: WBAT   Dispo: pending PT evaluation     Ortho Pager [phone]
SUBJECTIVE: Patient seen and examined. Pt doing well o/n.  No f/c/n/v/cp/sob.     OBJECTIVE:  Vital Signs Last 24 Hrs  T(C): 36.6 (22 Apr 2023 09:14), Max: 37 (21 Apr 2023 17:35)  T(F): 97.9 (22 Apr 2023 09:14), Max: 98.6 (21 Apr 2023 17:35)  HR: 62 (22 Apr 2023 09:14) (57 - 96)  BP: 97/59 (22 Apr 2023 09:14) (97/59 - 140/85)  BP(mean): 100 (21 Apr 2023 16:00) (89 - 111)  RR: 15 (22 Apr 2023 09:14) (8 - 24)  SpO2: 93% (22 Apr 2023 09:14) (86% - 100%)    Parameters below as of 22 Apr 2023 09:14  Patient On (Oxygen Delivery Method): room air        Affected extremity:          Dressing: clean/dry/intact            Sensation: SILT         Motor exam: 5/5 TA/GS/EHL         warm well perfused; capillary refill <3 seconds     LABS:                        10.6   7.17  )-----------( 165      ( 22 Apr 2023 05:30 )             33.0     04-22    142  |  106  |  11  ----------------------------<  101<H>  3.7   |  28  |  0.81    Ca    7.4<L>      22 Apr 2023 05:30          MEDICATIONS:acetaminophen     Tablet .. 975 milliGRAM(s) Oral every 8 hours  aspirin 325 milliGRAM(s) Oral every 12 hours  celecoxib 200 milliGRAM(s) Oral every 12 hours  gabapentin 800 milliGRAM(s) Oral daily  HYDROmorphone  Injectable 0.5 milliGRAM(s) IV Push every 5 minutes PRN  lactated ringers. 1000 milliLiter(s) IV Continuous <Continuous>  magnesium hydroxide Suspension 30 milliLiter(s) Oral daily PRN  ondansetron Injectable 4 milliGRAM(s) IV Push every 6 hours PRN  oxyCODONE    IR 5 milliGRAM(s) Oral every 4 hours PRN  oxyCODONE    IR 10 milliGRAM(s) Oral every 4 hours PRN  pantoprazole    Tablet 40 milliGRAM(s) Oral before breakfast  polyethylene glycol 3350 17 Gram(s) Oral at bedtime  senna 2 Tablet(s) Oral at bedtime    Anticoagulation:    Antibiotics:     Pain medications:   acetaminophen     Tablet .. 975 milliGRAM(s) Oral every 8 hours  aspirin 325 milliGRAM(s) Oral every 12 hours  celecoxib 200 milliGRAM(s) Oral every 12 hours  gabapentin 800 milliGRAM(s) Oral daily  HYDROmorphone  Injectable 0.5 milliGRAM(s) IV Push every 5 minutes PRN  ondansetron Injectable 4 milliGRAM(s) IV Push every 6 hours PRN  oxyCODONE    IR 5 milliGRAM(s) Oral every 4 hours PRN  oxyCODONE    IR 10 milliGRAM(s) Oral every 4 hours PRN    A/P :  Pt is a 56yo Female s/p  POD # 1 Right TKA  -    Pain control  -    DVT ppx: ASA     -    Weight bearing status: WBAT   -    Physical Therapy  -    Dispo: Home

## 2023-04-24 NOTE — DISCHARGE NOTE NURSING/CASE MANAGEMENT/SOCIAL WORK - NSDCPEFALRISK_GEN_ALL_CORE
For information on Fall & Injury Prevention, visit: [email or web address] OR  [email or web address] OR  https://www.cdc.gov/steadi/patient.html

## 2023-04-24 NOTE — DISCHARGE NOTE NURSING/CASE MANAGEMENT/SOCIAL WORK - PATIENT PORTAL LINK FT
You can access the FollowMyHealth Patient Portal offered by [facility] by registering at the following website: [email or web address]. By joining [facility]’s FollowMyHealth portal, you will also be able to view your health information using other applications (apps) compatible with our system.

## 2023-04-24 NOTE — PROGRESS NOTE ADULT - ASSESSMENT
A/P :  Pt is a 58yo Female s/p  POD # 1 Right TKA  -    Pain control  -    DVT ppx: ASA     -    Weight bearing status: WBAT   -    Physical Therapy  -    Dispo: Home  
57F pmhx lumbar stenosis w/ radiculopathy p/w right knee pain x 5 years following a meniscal injury while lifting presents to ortho service for right partial knee replacement now POD 1. Medicine comgmt consulted for remainder of care.    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

## 2023-04-27 DIAGNOSIS — M17.11 UNILATERAL PRIMARY OSTEOARTHRITIS, RIGHT KNEE: ICD-10-CM

## 2023-04-27 DIAGNOSIS — M54.16 RADICULOPATHY, LUMBAR REGION: ICD-10-CM

## 2023-04-27 DIAGNOSIS — E78.5 HYPERLIPIDEMIA, UNSPECIFIED: ICD-10-CM

## 2023-04-27 DIAGNOSIS — D62 ACUTE POSTHEMORRHAGIC ANEMIA: ICD-10-CM

## 2023-05-23 ENCOUNTER — INPATIENT (INPATIENT)
Facility: HOSPITAL | Age: 58
LOS: 9 days | Discharge: HOME HEALTH | DRG: 481 | End: 2023-06-02
Attending: STUDENT IN AN ORGANIZED HEALTH CARE EDUCATION/TRAINING PROGRAM | Admitting: ORTHOPAEDIC SURGERY
Payer: COMMERCIAL

## 2023-05-23 VITALS
SYSTOLIC BLOOD PRESSURE: 99 MMHG | WEIGHT: 214.95 LBS | HEIGHT: 65 IN | RESPIRATION RATE: 18 BRPM | OXYGEN SATURATION: 94 % | HEART RATE: 77 BPM | DIASTOLIC BLOOD PRESSURE: 48 MMHG | TEMPERATURE: 98 F

## 2023-05-23 DIAGNOSIS — Z87.81 PERSONAL HISTORY OF (HEALED) TRAUMATIC FRACTURE: Chronic | ICD-10-CM

## 2023-05-23 DIAGNOSIS — Z98.890 OTHER SPECIFIED POSTPROCEDURAL STATES: Chronic | ICD-10-CM

## 2023-05-23 DIAGNOSIS — M50.20 OTHER CERVICAL DISC DISPLACEMENT, UNSPECIFIED CERVICAL REGION: Chronic | ICD-10-CM

## 2023-05-23 PROBLEM — E78.5 HYPERLIPIDEMIA, UNSPECIFIED: Chronic | Status: ACTIVE | Noted: 2023-04-20

## 2023-05-23 LAB
ANION GAP SERPL CALC-SCNC: 13 MMOL/L — SIGNIFICANT CHANGE UP (ref 5–17)
BASOPHILS # BLD AUTO: 0.05 K/UL — SIGNIFICANT CHANGE UP (ref 0–0.2)
BASOPHILS NFR BLD AUTO: 0.4 % — SIGNIFICANT CHANGE UP (ref 0–2)
BUN SERPL-MCNC: 17 MG/DL — SIGNIFICANT CHANGE UP (ref 7–23)
CALCIUM SERPL-MCNC: 9.1 MG/DL — SIGNIFICANT CHANGE UP (ref 8.4–10.5)
CHLORIDE SERPL-SCNC: 104 MMOL/L — SIGNIFICANT CHANGE UP (ref 96–108)
CO2 SERPL-SCNC: 25 MMOL/L — SIGNIFICANT CHANGE UP (ref 22–31)
CREAT SERPL-MCNC: 0.74 MG/DL — SIGNIFICANT CHANGE UP (ref 0.5–1.3)
EGFR: 93 ML/MIN/1.73M2 — SIGNIFICANT CHANGE UP
EOSINOPHIL # BLD AUTO: 0.14 K/UL — SIGNIFICANT CHANGE UP (ref 0–0.5)
EOSINOPHIL NFR BLD AUTO: 1.3 % — SIGNIFICANT CHANGE UP (ref 0–6)
GLUCOSE SERPL-MCNC: 116 MG/DL — HIGH (ref 70–99)
HCT VFR BLD CALC: 40.1 % — SIGNIFICANT CHANGE UP (ref 34.5–45)
HGB BLD-MCNC: 12.9 G/DL — SIGNIFICANT CHANGE UP (ref 11.5–15.5)
IMM GRANULOCYTES NFR BLD AUTO: 0.5 % — SIGNIFICANT CHANGE UP (ref 0–0.9)
LYMPHOCYTES # BLD AUTO: 1.5 K/UL — SIGNIFICANT CHANGE UP (ref 1–3.3)
LYMPHOCYTES # BLD AUTO: 13.4 % — SIGNIFICANT CHANGE UP (ref 13–44)
MCHC RBC-ENTMCNC: 30.3 PG — SIGNIFICANT CHANGE UP (ref 27–34)
MCHC RBC-ENTMCNC: 32.2 GM/DL — SIGNIFICANT CHANGE UP (ref 32–36)
MCV RBC AUTO: 94.1 FL — SIGNIFICANT CHANGE UP (ref 80–100)
MONOCYTES # BLD AUTO: 0.52 K/UL — SIGNIFICANT CHANGE UP (ref 0–0.9)
MONOCYTES NFR BLD AUTO: 4.7 % — SIGNIFICANT CHANGE UP (ref 2–14)
NEUTROPHILS # BLD AUTO: 8.91 K/UL — HIGH (ref 1.8–7.4)
NEUTROPHILS NFR BLD AUTO: 79.7 % — HIGH (ref 43–77)
NRBC # BLD: 0 /100 WBCS — SIGNIFICANT CHANGE UP (ref 0–0)
PLATELET # BLD AUTO: 247 K/UL — SIGNIFICANT CHANGE UP (ref 150–400)
POTASSIUM SERPL-MCNC: 3.8 MMOL/L — SIGNIFICANT CHANGE UP (ref 3.5–5.3)
POTASSIUM SERPL-SCNC: 3.8 MMOL/L — SIGNIFICANT CHANGE UP (ref 3.5–5.3)
RBC # BLD: 4.26 M/UL — SIGNIFICANT CHANGE UP (ref 3.8–5.2)
RBC # FLD: 13.1 % — SIGNIFICANT CHANGE UP (ref 10.3–14.5)
RH IG SCN BLD-IMP: POSITIVE — SIGNIFICANT CHANGE UP
SODIUM SERPL-SCNC: 142 MMOL/L — SIGNIFICANT CHANGE UP (ref 135–145)
T4 AB SER-ACNC: 6.31 UG/DL — SIGNIFICANT CHANGE UP (ref 4.5–11.7)
TSH SERPL-MCNC: 3.03 UIU/ML — SIGNIFICANT CHANGE UP (ref 0.27–4.2)
WBC # BLD: 11.18 K/UL — HIGH (ref 3.8–10.5)
WBC # FLD AUTO: 11.18 K/UL — HIGH (ref 3.8–10.5)

## 2023-05-23 PROCEDURE — 99255 IP/OBS CONSLTJ NEW/EST HI 80: CPT | Mod: GC

## 2023-05-23 PROCEDURE — 73560 X-RAY EXAM OF KNEE 1 OR 2: CPT | Mod: 26,RT

## 2023-05-23 PROCEDURE — 73501 X-RAY EXAM HIP UNI 1 VIEW: CPT | Mod: 26,RT

## 2023-05-23 PROCEDURE — 73551 X-RAY EXAM OF FEMUR 1: CPT | Mod: 26,RT

## 2023-05-23 PROCEDURE — 99223 1ST HOSP IP/OBS HIGH 75: CPT | Mod: GC

## 2023-05-23 PROCEDURE — 71045 X-RAY EXAM CHEST 1 VIEW: CPT | Mod: 26

## 2023-05-23 PROCEDURE — 99285 EMERGENCY DEPT VISIT HI MDM: CPT

## 2023-05-23 RX ORDER — HYDROMORPHONE HYDROCHLORIDE 2 MG/ML
0.5 INJECTION INTRAMUSCULAR; INTRAVENOUS; SUBCUTANEOUS ONCE
Refills: 0 | Status: DISCONTINUED | OUTPATIENT
Start: 2023-05-23 | End: 2023-05-23

## 2023-05-23 RX ORDER — CHLORHEXIDINE GLUCONATE 213 G/1000ML
1 SOLUTION TOPICAL
Refills: 0 | Status: COMPLETED | OUTPATIENT
Start: 2023-05-23 | End: 2023-05-27

## 2023-05-23 RX ORDER — OXYCODONE HYDROCHLORIDE 5 MG/1
10 TABLET ORAL EVERY 4 HOURS
Refills: 0 | Status: DISCONTINUED | OUTPATIENT
Start: 2023-05-23 | End: 2023-05-25

## 2023-05-23 RX ORDER — MORPHINE SULFATE 50 MG/1
4 CAPSULE, EXTENDED RELEASE ORAL ONCE
Refills: 0 | Status: DISCONTINUED | OUTPATIENT
Start: 2023-05-23 | End: 2023-05-23

## 2023-05-23 RX ORDER — HYDROMORPHONE HYDROCHLORIDE 2 MG/ML
0.2 INJECTION INTRAMUSCULAR; INTRAVENOUS; SUBCUTANEOUS EVERY 6 HOURS
Refills: 0 | Status: DISCONTINUED | OUTPATIENT
Start: 2023-05-23 | End: 2023-05-26

## 2023-05-23 RX ORDER — OXYCODONE HYDROCHLORIDE 5 MG/1
5 TABLET ORAL EVERY 4 HOURS
Refills: 0 | Status: DISCONTINUED | OUTPATIENT
Start: 2023-05-23 | End: 2023-05-25

## 2023-05-23 RX ADMIN — OXYCODONE HYDROCHLORIDE 10 MILLIGRAM(S): 5 TABLET ORAL at 22:23

## 2023-05-23 RX ADMIN — MORPHINE SULFATE 4 MILLIGRAM(S): 50 CAPSULE, EXTENDED RELEASE ORAL at 16:22

## 2023-05-23 RX ADMIN — MORPHINE SULFATE 4 MILLIGRAM(S): 50 CAPSULE, EXTENDED RELEASE ORAL at 15:41

## 2023-05-23 RX ADMIN — HYDROMORPHONE HYDROCHLORIDE 0.5 MILLIGRAM(S): 2 INJECTION INTRAMUSCULAR; INTRAVENOUS; SUBCUTANEOUS at 19:25

## 2023-05-23 RX ADMIN — HYDROMORPHONE HYDROCHLORIDE 0.5 MILLIGRAM(S): 2 INJECTION INTRAMUSCULAR; INTRAVENOUS; SUBCUTANEOUS at 19:10

## 2023-05-23 RX ADMIN — OXYCODONE HYDROCHLORIDE 10 MILLIGRAM(S): 5 TABLET ORAL at 23:23

## 2023-05-23 NOTE — PROGRESS NOTE ADULT - SUBJECTIVE AND OBJECTIVE BOX
SUBJECTIVE: Patient seen and examined. Pt doing well o/n.  No f/c/n/v/cp/sob.     OBJECTIVE:  Vital Signs Last 24 Hrs  T(C): 36.7 (23 May 2023 18:23), Max: 36.7 (23 May 2023 16:04)  T(F): 98.1 (23 May 2023 18:23), Max: 98.1 (23 May 2023 18:23)  HR: 66 (23 May 2023 18:23) (55 - 77)  BP: 132/75 (23 May 2023 18:23) (99/48 - 154/75)  BP(mean): --  RR: 18 (23 May 2023 18:23) (18 - 18)  SpO2: 96% (23 May 2023 18:23) (94% - 96%)    Parameters below as of 23 May 2023 18:23  Patient On (Oxygen Delivery Method): room air        Affected extremity:          Dressing: clean/dry/intact            Sensation: SILT         Motor exam: 5/5 TA/GS/EHL         warm well perfused; capillary refill <3 seconds     LABS:                        12.9   11.18 )-----------( 247      ( 23 May 2023 15:49 )             40.1     05-23    142  |  104  |  17  ----------------------------<  116<H>  3.8   |  25  |  0.74    Ca    9.1      23 May 2023 15:49          MEDICATIONS:chlorhexidine 2% Cloths 1 Application(s) Topical <User Schedule>  HYDROmorphone  Injectable 0.5 milliGRAM(s) IV Push once    Anticoagulation:    Antibiotics:     Pain medications:   HYDROmorphone  Injectable 0.5 milliGRAM(s) IV Push once    A/P :  Pt is a 58yo Female s/p  fall  Femur fracture  -    Admit  - Medical optimization and clearance from medicine team  - Planed for femoral nailing  -Pain control  - Immobilizer for now

## 2023-05-23 NOTE — H&P ADULT - ASSESSMENT
A/P: 57yFemale     - Admit to Ortho  - Med clearance/Anesthesia clearance    - Pre-op labs and imaging - CXR, EKG,CBC, BM, T&S/ABO, PT/PTT/INR, +/- pregnancy test  - NPO MN prior to OR  - NWB RLE  - AC per primary  -Analgesia PRN per primary  - B/l SCDs      Discussed with Dr. Cole who agrees with plan    Ortho Pager [phone]

## 2023-05-23 NOTE — PATIENT PROFILE ADULT - FALL HARM RISK - RISK INTERVENTIONS
no dysuria, no frequency, and no hematuria.
Assistance OOB with selected safe patient handling equipment/Assistance with ambulation/Communicate Fall Risk and Risk Factors to all staff, patient, and family/Discuss with provider need for PT consult/Monitor gait and stability/Provide patient with walking aids - walker, cane, crutches/Reinforce activity limits and safety measures with patient and family/Reorient to person, place and time as needed/Review medications for side effects contributing to fall risk/Sit up slowly, dangle for a short time, stand at bedside before walking/Toileting schedule using arm’s reach rule for commode and bathroom/Use of alarms - bed, chair and/or voice tab/Visual Cue: Yellow wristband/Bed in lowest position, wheels locked, appropriate side rails in place/Call bell, personal items and telephone in reach/Instruct patient to call for assistance before getting out of bed or chair/Non-slip footwear when patient is out of bed/[place] to call system/Physically safe environment - no spills, clutter or unnecessary equipment/Purposeful Proactive Rounding/Room/bathroom lighting operational, light cord in reach

## 2023-05-23 NOTE — ED ADULT TRIAGE NOTE - SOURCE OF INFORMATION
[FreeTextEntry1] : physical [de-identified] : 77 yo man with h/o as below here for CPE.\par Had slowly raising PSA, had enhanced PET scan showing small area on iliac bone, will be doing radiation to the area for 5 sessions.  \par No other active issues.  Saw GI DR. Connors last year and didn't find any more Jay's.  Takes protonix once every 3 days, taking pepcid twice/day and doing well.\par No other active issues. Patient

## 2023-05-23 NOTE — H&P ADULT - HISTORY OF PRESENT ILLNESS
Orthopaedic Surgery Consult Note    For Surgeon:    HPI:  57yFemale  Patient is a 57y old  Female who presents with a chief complaint of   HPI:      Allergies    No Known Allergies    Intolerances      PAST MEDICAL & SURGICAL HISTORY:  HLD (hyperlipidemia)      History of left shoulder fracture      Herniated cervical disc      H/O arthroscopy of knee  B/L        MEDICATIONS  (STANDING):  morphine  - Injectable 4 milliGRAM(s) IV Push Once    MEDICATIONS  (PRN):                                12.9   11.18 )-----------( 247      ( 23 May 2023 15:49 )             40.1     05-23    142  |  104  |  17  ----------------------------<  116<H>  3.8   |  25  |  0.74    Ca    9.1      23 May 2023 15:49        Imaging:     A/P: 57yFemale    -Discussed with Dr. Park Pager [phone]   Orthopaedic Surgery Consult Note    For Surgeon: Dr. Cole      Patient is a 57y old  Female s/p r TKA POD#2 who presents today w/ Femoral shaft fracture  HPI:      Allergies    No Known Allergies    Intolerances      PAST MEDICAL & SURGICAL HISTORY:  HLD (hyperlipidemia)      History of left shoulder fracture      Herniated cervical disc      H/O arthroscopy of knee  B/L        MEDICATIONS  (STANDING):  morphine  - Injectable 4 milliGRAM(s) IV Push Once    MEDICATIONS  (PRN):                                12.9   11.18 )-----------( 247      ( 23 May 2023 15:49 )             40.1     05-23    142  |  104  |  17  ----------------------------<  116<H>  3.8   |  25  |  0.74    Ca    9.1      23 May 2023 15:49        Imaging:     A/P: 57yFemale    -Discussed with Dr. Park Pager [phone]   Orthopaedic Surgery Consult Note    For Surgeon: Dr. Cole      Patient is a 57y old  Female s/p r TKA POD#2 PMHx HLD who presents today w/ distal 1/3 Femoral shaft fracture sustained during mechanical fall onto her R side around 1pm this morning while in the shower.   Patient at baseline ambulates without assitance    Pt presents today with persistent and constant pain. Denies HS/LOC.  Without any new weakness/n/t/p. No other MSK complaints.    PMHx - HLD    PSx -R TKA, bilat knee scope    Social (-)Tobacco (-)EtOh (-)Elicit substance

## 2023-05-23 NOTE — PATIENT PROFILE ADULT - SURGICAL SITE DRAIN
The patient was referred to us by Dr. Holli Dyer for bloating, diarrhea and rectal bleeding. A copy of this note will be sent to the referring physician.   Mrs. Carey had been scheduled for a routine colonoscopy, however presented to Dr. Coleman's office in the interim for evaluation of diarrhea.    Symptoms started 4 weeks prir. She described having 5-6 bowel movements a day mostly in the morning associated with bloating. + blood in the stool & mild abdominal cramping.  No nausea no vomiting.   Labs showed mildly elevated AST of 48 & ALT of 60.  Today we reviewed her colonoscopy results. The path report unfortunately is not yet available.  She is overall feeling 50% better since starting the Mesalamine PO and enemas. She got these free of cost through her insurance. She has not had any further rectal bleeding and the diarrhea is starting to subside.  No family history of colon cancer.   Her ferritin levels were high, whereas the iron level was low. She follows with Dr. Vanessa Vuong She does admit to fatigue.  She works for the [employer].   Summary of prior workup: - Col by me on 9/21/22: Normal terminal ileum, mild to moderate colitis suggestive of UC throughout the entire colon.  Small nonbleeding grade 1 internal hemorrhoids.  Quality of the prep was good. - RUQ US in 7/29/22: Unremarkable  - Labs on 7/29/22: ALT 69.
no

## 2023-05-23 NOTE — CONSULT NOTE ADULT - ATTENDING COMMENTS
#Preop: RCRI 0, Class 1 risk. METs >4. EKG nsr. Pt is low risk for intermediate risk procedure. No further work up indicated.

## 2023-05-23 NOTE — ED ADULT NURSE NOTE - OBJECTIVE STATEMENT
57y F, presents to ED s/p slip and fall today while trying to get out the shower. Pt reports PMHx of right total knee replacement on 4/21/2023. Pt states "I slipped in the bath tub and now my r leg hurts. My rt knee just gave out and I fell." Pt arrived to the ED crying loudly. Pt not able to bear weight on leg. Ambulates with walker at baseline. Sensation in tact. Pt is able to wiggle her toes in bilat feet. Pt endorses aspirin use daily. Denies head strike, LOC, dizziness, lightheadedness, loss of control of bowels/bladder, no blood thinner use. 57y F, presents to ED s/p slip and fall today while trying to get out the shower. Pt reports PMHx of right total knee replacement on 4/21/2023. Pt states "I slipped in the bath tub and now my r leg hurts. My rt knee just gave out and I fell." Pt arrived to the ED crying loudly. Pt not able to bear weight on leg. Ambulates with walker at baseline. Sensation in tact. Pt is able to wiggle her toes in bilat feet. Pt endorses aspirin use daily. Denies head strike, LOC, dizziness, lightheadedness, no blood thinner use.

## 2023-05-23 NOTE — PATIENT PROFILE ADULT - FUNCTIONAL ASSESSMENT - BASIC MOBILITY 6.
1-calculated by average/Not able to assess (calculate score using [facility] averaging method)

## 2023-05-23 NOTE — ED PROVIDER NOTE - CLINICAL SUMMARY MEDICAL DECISION MAKING FREE TEXT BOX
56 y/o female w/ hx R TKR on 4/21/23 p/w R thigh pain s/p mechanical trip and fall in the shower just prior to arrival to ED.  Denies head strike, loc.  Denies preceding dizziness.  Denies numbness/tingling/weakness to ext.  No recent illness, f/c.  Has not taken anything for pain.  Unable to get up on own.  Pt crying in pain, does not want to range R hip 2/2 pain.  NVI  C/f fx.  No other clear injury  Will get xrs hip, pelvis, femur, knee  Preop labs in case of fx  Pain control  Re-eval

## 2023-05-23 NOTE — ED PROVIDER NOTE - OBJECTIVE STATEMENT
58 y/o female w/ hx R TKR on 4/21/23 p/w R thigh pain s/p mechanical trip and fall in the shower just prior to arrival to ED.  Denies head strike, loc.  Denies preceding dizziness.  Denies numbness/tingling/weakness to ext.  No recent illness, f/c.  Has not taken anything for pain.  Unable to get up on own.

## 2023-05-23 NOTE — ED PROVIDER NOTE - NS ED ROS FT
CONSTITUTIONAL: Denies fever and chills    MUSCULOSKELETAL: See HPI    NEUROLOGICAL: Denies headache and syncope

## 2023-05-23 NOTE — ED PROVIDER NOTE - ATTENDING APP SHARED VISIT CONTRIBUTION OF CARE
Slip and fall injury R thigh/hip.  + femur fx.  HD stable.  Preop noted.  No complications apparent.  No other trauma noted.  Ortho to admit for fixation.

## 2023-05-23 NOTE — H&P ADULT - NSHPLABSRESULTS_GEN_ALL_CORE
12.9   11.18 )-----------( 247      ( 23 May 2023 15:49 )             40.1     05-23    142  |  104  |  17  ----------------------------<  116<H>  3.8   |  25  |  0.74    Ca    9.1      23 May 2023 15:49

## 2023-05-23 NOTE — ED ADULT NURSE NOTE - NSFALLRISKINTERV_ED_ALL_ED

## 2023-05-23 NOTE — ED ADULT TRIAGE NOTE - CHIEF COMPLAINT QUOTE
Pt states "I slipped in the bath tub and now my r leg hurts. Pt had r knee replacement 1 month ago." Pt tearful in triage. Pt not able to bear weight on leg. No head injury, no LOC, use of ASA daily.

## 2023-05-23 NOTE — CONSULT NOTE ADULT - SUBJECTIVE AND OBJECTIVE BOX
CONSULT NOTE - INTERNAL MEDICINE  *INCOMPLETE UNTIL DISCUSSED WITH MEDICINE ATTENDING*    BROOKLYN RAMOS  [id number]  57y  Female    Patient is a 57y old  Female who presents with a chief complaint of Femur fracture after h/o fall (23 May 2023 18:52)    Patient is a 57y old  Female s/p r TKA POD#2 PMHx HLD who presents today w/ distal 1/3 Femoral shaft fracture sustained during mechanical fall onto her R side around 1pm this morning while in the shower. Patient presents today with persistent and constant pain. Fall appears to have been mechanical in nature. Patient felt no prodrome. Patient did not lose consciousness. Patient has no significant cardiac or pulmonary Hx. Patient was able to climb a flight of stairs prior to her fall without getting short of breath. Home medications include rosuvastatin 5 mg PO QD and celecoxib 200 mg PO QD. EKG NSR with low voltage likely due to body habitus. chest X ray shows no acute pathology. Patient tolerated recent anesthesia and surgery well.     PAST MEDICAL/SURGICAL HISTORY  PAST MEDICAL & SURGICAL HISTORY:  HLD (hyperlipidemia)      History of left shoulder fracture      Herniated cervical disc      H/O arthroscopy of knee  B/L          REVIEW OF SYSTEMS:  CONSTITUTIONAL: No fever, weight loss, or fatigue  EYES: No eye pain, visual disturbances, or discharge  ENMT:  No difficulty hearing, tinnitus, vertigo; No sinus or throat pain  NECK: No pain or stiffness  BREASTS: No pain, masses, or nipple discharge  RESPIRATORY: No cough, wheezing, chills or hemoptysis; No shortness of breath  CARDIOVASCULAR: No chest pain, palpitations, dizziness, or leg swelling  GASTROINTESTINAL: No abdominal or epigastric pain. No nausea, vomiting, or hematemesis; No diarrhea or constipation. No melena or hematochezia.  GENITOURINARY: No dysuria, frequency, hematuria, or incontinence  NEUROLOGICAL: No headaches, memory loss, loss of strength, numbness, or tremors  SKIN: No itching, burning, rashes, or lesions   LYMPH NODES: No enlarged glands  ENDOCRINE: No heat or cold intolerance; No hair loss  MUSCULOSKELETAL: SEVERE PAIN IN HER R THIGH  PSYCHIATRIC: No depression, anxiety, mood swings, or difficulty sleeping  HEME/LYMPH: No easy bruising, or bleeding gums  ALLERY AND IMMUNOLOGIC: No hives or eczema    T(C): 36.7 (05-23-23 @ 18:23), Max: 36.7 (05-23-23 @ 16:04)  HR: 66 (05-23-23 @ 18:23) (55 - 77)  BP: 132/75 (05-23-23 @ 18:23) (99/48 - 154/75)  RR: 18 (05-23-23 @ 18:23) (18 - 18)  SpO2: 96% (05-23-23 @ 18:23) (94% - 96%)  Wt(kg): --Vital Signs Last 24 Hrs  T(C): 36.7 (23 May 2023 18:23), Max: 36.7 (23 May 2023 16:04)  T(F): 98.1 (23 May 2023 18:23), Max: 98.1 (23 May 2023 18:23)  HR: 66 (23 May 2023 18:23) (55 - 77)  BP: 132/75 (23 May 2023 18:23) (99/48 - 154/75)  BP(mean): --  RR: 18 (23 May 2023 18:23) (18 - 18)  SpO2: 96% (23 May 2023 18:23) (94% - 96%)    Parameters below as of 23 May 2023 18:23  Patient On (Oxygen Delivery Method): room air        PHYSICAL EXAM:  GENERAL: NAD, well-groomed, well-developed  HEAD:  Atraumatic, Normocephalic  EYES: EOMI, PERRLA, conjunctiva and sclera clear  ENMT: No tonsillar erythema, exudates, or enlargement; Moist mucous membranes, Good dentition, No lesions  NECK: Supple, No JVD, Normal thyroid  NERVOUS SYSTEM:  Alert & Oriented X3, Good concentration; Motor Strength 5/5 B/L upper and lower extremities; DTRs 2+ intact and symmetric  CHEST/LUNG: Clear to percussion bilaterally; No rales, rhonchi, wheezing, or rubs  HEART: Regular rate and rhythm; No murmurs, rubs, or gallops  ABDOMEN: Soft, Nontender, Nondistended; Bowel sounds present  EXTREMITIES:  Edematous R thigh  LYMPH: No lymphadenopathy noted  SKIN: No rashes or lesions    Consultant(s) Notes Reviewed:  [x ] YES  [ ] NO  Care Discussed with Consultants/Other Providers [ x] YES  [ ] NO    LABS:                        12.9   11.18 )-----------( 247      ( 23 May 2023 15:49 )             40.1     05-23    142  |  104  |  17  ----------------------------<  116<H>  3.8   |  25  |  0.74    Ca    9.1      23 May 2023 15:49                  RADIOLOGY & ADDITIONAL TESTS:    Imaging Personally Reviewed:  [ ] YES  [ ] NO CONSULT NOTE - INTERNAL MEDICINE  *INCOMPLETE UNTIL DISCUSSED WITH MEDICINE ATTENDING*    BROOKLYN RAMOS  [id number]  57y  Female    Patient is a 57y old  Female who presents with a chief complaint of Femur fracture after h/o fall (23 May 2023 18:52)    Patient is a 57y old  Female s/p r TKA POD#2 PMHx HLD who presents today w/ distal 1/3 Femoral shaft fracture sustained during mechanical fall onto her R side around 1pm this morning while in the shower. Patient presents today with persistent and constant pain. Fall appears to have been mechanical in nature. Patient felt no prodrome. Patient did not lose consciousness. Patient has no significant cardiac or pulmonary Hx. Patient was able to climb a flight of stairs prior to her fall without getting short of breath. Home medications include rosuvastatin 5 mg PO QD and celecoxib 200 mg PO QD. EKG NSR with low voltage likely due to body habitus. chest X ray shows no acute pathology. Patient tolerated recent anesthesia and surgery well.     PAST MEDICAL/SURGICAL HISTORY  PAST MEDICAL & SURGICAL HISTORY:  HLD (hyperlipidemia)      History of left shoulder fracture      Herniated cervical disc      H/O arthroscopy of knee  B/L    T(C): 36.7 (05-23-23 @ 18:23), Max: 36.7 (05-23-23 @ 16:04)  HR: 66 (05-23-23 @ 18:23) (55 - 77)  BP: 132/75 (05-23-23 @ 18:23) (99/48 - 154/75)  RR: 18 (05-23-23 @ 18:23) (18 - 18)  SpO2: 96% (05-23-23 @ 18:23) (94% - 96%)  Wt(kg): --Vital Signs Last 24 Hrs  T(C): 36.7 (23 May 2023 18:23), Max: 36.7 (23 May 2023 16:04)  T(F): 98.1 (23 May 2023 18:23), Max: 98.1 (23 May 2023 18:23)  HR: 66 (23 May 2023 18:23) (55 - 77)  BP: 132/75 (23 May 2023 18:23) (99/48 - 154/75)  BP(mean): --  RR: 18 (23 May 2023 18:23) (18 - 18)  SpO2: 96% (23 May 2023 18:23) (94% - 96%)    Parameters below as of 23 May 2023 18:23  Patient On (Oxygen Delivery Method): room air        PHYSICAL EXAM:  GENERAL: moderate distress  HEAD:  Atraumatic, Normocephalic  EYES: EOMI, PERRLA, conjunctiva and sclera clear  ENMT: No tonsillar erythema, exudates, or enlargement; Moist mucous membranes, Good dentition, No lesions  NECK: Supple, No JVD, Normal thyroid  NERVOUS SYSTEM:  Alert & Oriented X3, no focal deficits  CHEST/LUNG: Clear to percussion bilaterally; No rales, rhonchi, wheezing, or rubs  HEART: Regular rate and rhythm; No murmurs, rubs, or gallops  ABDOMEN: Soft, Nontender, Nondistended; Bowel sounds present  EXTREMITIES:  Edematous R thigh      Consultant(s) Notes Reviewed:  [x ] YES  [ ] NO  Care Discussed with Consultants/Other Providers [ x] YES  [ ] NO    LABS:                        12.9   11.18 )-----------( 247      ( 23 May 2023 15:49 )             40.1     05-23    142  |  104  |  17  ----------------------------<  116<H>  3.8   |  25  |  0.74    Ca    9.1      23 May 2023 15:49                  RADIOLOGY & ADDITIONAL TESTS:    Imaging Personally Reviewed:  [ ] YES  [ ] NO CONSULT NOTE - INTERNAL MEDICINE  *INCOMPLETE UNTIL DISCUSSED WITH MEDICINE ATTENDING*    BROOKLYN RAMOS  [id number]  57y  Female    Patient is a 57y old  Female who presents with a chief complaint of Femur fracture after h/o fall (23 May 2023 18:52)    Patient is a 57y old  Female s/p r TKA PMHx HLD who presents today w/ distal 1/3 Femoral shaft fracture sustained during mechanical fall onto her R side around 1pm this morning while in the shower. Patient presents today with persistent and constant pain. Fall appears to have been mechanical in nature. Patient felt no prodrome. Patient did not lose consciousness. Patient has no significant cardiac or pulmonary Hx. Patient was able to climb a flight of stairs prior to her fall without getting short of breath. Home medications include rosuvastatin 5 mg PO QD and celecoxib 200 mg PO QD. EKG NSR with low voltage likely due to body habitus. chest X ray shows no acute pathology. Patient tolerated recent anesthesia and surgery well.     PAST MEDICAL/SURGICAL HISTORY  PAST MEDICAL & SURGICAL HISTORY:  HLD (hyperlipidemia)      History of left shoulder fracture      Herniated cervical disc      H/O arthroscopy of knee  B/L    T(C): 36.7 (05-23-23 @ 18:23), Max: 36.7 (05-23-23 @ 16:04)  HR: 66 (05-23-23 @ 18:23) (55 - 77)  BP: 132/75 (05-23-23 @ 18:23) (99/48 - 154/75)  RR: 18 (05-23-23 @ 18:23) (18 - 18)  SpO2: 96% (05-23-23 @ 18:23) (94% - 96%)  Wt(kg): --Vital Signs Last 24 Hrs  T(C): 36.7 (23 May 2023 18:23), Max: 36.7 (23 May 2023 16:04)  T(F): 98.1 (23 May 2023 18:23), Max: 98.1 (23 May 2023 18:23)  HR: 66 (23 May 2023 18:23) (55 - 77)  BP: 132/75 (23 May 2023 18:23) (99/48 - 154/75)  BP(mean): --  RR: 18 (23 May 2023 18:23) (18 - 18)  SpO2: 96% (23 May 2023 18:23) (94% - 96%)    Parameters below as of 23 May 2023 18:23  Patient On (Oxygen Delivery Method): room air        PHYSICAL EXAM:  GENERAL: moderate distress  HEAD:  Atraumatic, Normocephalic  EYES: EOMI, PERRLA, conjunctiva and sclera clear  ENMT: No tonsillar erythema, exudates, or enlargement; Moist mucous membranes, Good dentition, No lesions  NECK: Supple, No JVD, Normal thyroid  NERVOUS SYSTEM:  Alert & Oriented X3, no focal deficits  CHEST/LUNG: Clear to percussion bilaterally; No rales, rhonchi, wheezing, or rubs  HEART: Regular rate and rhythm; No murmurs, rubs, or gallops  ABDOMEN: Soft, Nontender, Nondistended; Bowel sounds present  EXTREMITIES:  Edematous R thigh      Consultant(s) Notes Reviewed:  [x ] YES  [ ] NO  Care Discussed with Consultants/Other Providers [ x] YES  [ ] NO    LABS:                        12.9   11.18 )-----------( 247      ( 23 May 2023 15:49 )             40.1     05-23    142  |  104  |  17  ----------------------------<  116<H>  3.8   |  25  |  0.74    Ca    9.1      23 May 2023 15:49                  RADIOLOGY & ADDITIONAL TESTS:    Imaging Personally Reviewed:  [ ] YES  [ ] NO

## 2023-05-23 NOTE — CONSULT NOTE ADULT - ASSESSMENT
Patient is a 57y Female presenting with R femur fx    Admitted under orthopedic surgery  Medicine consulted for pre-operative clearance    Patient is a 57y old  Female s/p r TKA POD#2 PMHx HLD who presents today w/ distal 1/3 Femoral shaft fracture sustained during mechanical fall onto her R side around 1pm this morning while in the shower. Patient presents today with persistent and constant pain. Fall appears to have been mechanical in nature. Patient felt no prodrome. Patient did not lose consciousness. Patient has no significant cardiac or pulmonary Hx. Patient was able to climb a flight of stairs prior to her fall without getting short of breath. Home medications include rosuvastatin 5 mg PO QD and celecoxib 200 mg PO QD. EKG NSR with low voltage likely due to body habitus. chest X ray shows no acute pathology. Patient tolerated recent anesthesia and surgery well.     METS: > 4 prior to fx  Izaiah perioperative risk: 0.0%  Revised Cardiac Risk Index: Class I risk    Patient is considered LOW risk for MODERATE risk procedure.    NOTE AND RECOMMENDATIONS NOT FINALIZED UNTIL REVIEWED AND SIGNED BY INTERNAL MEDICINE ATTENDING. Patient is a 57y Female presenting with R femur fx    Admitted under orthopedic surgery  Medicine consulted for pre-operative clearance    Patient is a 57y old  Female s/p r TKA PMHx HLD who presents today w/ distal 1/3 Femoral shaft fracture sustained during mechanical fall onto her R side around 1pm this morning while in the shower. Patient presents today with persistent and constant pain. Fall appears to have been mechanical in nature. Patient felt no prodrome. Patient did not lose consciousness. Patient has no significant cardiac or pulmonary Hx. Patient was able to climb a flight of stairs prior to her fall without getting short of breath. Home medications include rosuvastatin 5 mg PO QD and celecoxib 200 mg PO QD. EKG NSR with low voltage likely due to body habitus. chest X ray shows no acute pathology. Patient tolerated recent anesthesia and surgery well.     METS: > 4 prior to fx  Bliss perioperative risk: 0.0%  Revised Cardiac Risk Index: Class I risk    Patient is considered LOW risk for MODERATE risk procedure.    NOTE AND RECOMMENDATIONS NOT FINALIZED UNTIL REVIEWED AND SIGNED BY INTERNAL MEDICINE ATTENDING. Patient is a 57y Female presenting with R femur fx    Admitted under orthopedic surgery  Medicine consulted for pre-operative clearance    Patient is a 57y old  Female s/p r TKA (4/21/2023) PMHx HLD who presents today w/ distal 1/3 Femoral shaft fracture sustained during mechanical fall onto her R side around 1pm this morning while in the shower. Patient presents today with persistent and constant pain. Fall appears to have been mechanical in nature. Patient felt no prodrome. Patient did not lose consciousness. Patient has no significant cardiac or pulmonary Hx. Patient was able to climb a flight of stairs prior to her fall without getting short of breath. Home medications include rosuvastatin 5 mg PO QD and celecoxib 200 mg PO QD. EKG NSR with low voltage likely due to body habitus. chest X ray shows no acute pathology. Patient tolerated anesthesia and surgery on 4/21/2023 well.     METS: > 4 prior to fx  Bliss perioperative risk: 0.0%  Revised Cardiac Risk Index: Class I risk    Patient is considered LOW risk for MODERATE risk procedure.    NOTE AND RECOMMENDATIONS NOT FINALIZED UNTIL REVIEWED AND SIGNED BY INTERNAL MEDICINE ATTENDING.

## 2023-05-23 NOTE — H&P ADULT - NSHPPHYSICALEXAM_GEN_ALL_CORE
AVSS  Gen: NAD, AOx3    RLE  Skin intact no open injuries; s, R thigh pain    Unable to SLR, PF/DF intact, toes mobile  EHL/TA/GS intact to motor  Pain with log roll or axial loading  SILT Saph/amalia/sp/dp/tib  Palpable DP & PT  Brisk cap refill distall

## 2023-05-23 NOTE — ED PROVIDER NOTE - PHYSICAL EXAMINATION
CONSTITUTIONAL: Awake, alert.  Crying, appears uncomfortable    HEAD: Normocephalic, atraumatic.    EYES: Conjunctivae clear without exudates or hemorrhage. Sclera is non-icteric.    ENT: Normal appearing external ears, nose, mucous membranes moist.    NECK: supple, trachea midline.  No midline ttp    HEART:  Normal rate, regular rhythm.  Heart sounds S1, S2.  No murmurs, rubs or gallops.    LUNGS:  No acute respiratory distress.  Non-tachypneic and non-labored.  Lungs are clear bilaterally with good aeration.  No wheezing, rales, rhonchi.    MUSCULOSKELETAL:  R hip crossed over LLE.  Pt does not want to move RLE 2/2 intense pain.  Able to plantar/dorsi flex R foot.  Sensation and motor function grossly intact.  Strong equal peripheral pulses b/l.   Cap refill < 2 b/l upper and lower ext.  All compartments soft.    NEUROLOGICAL:  Patient is alert, oriented x person, place and time.    PSYCH: Appropriate mood and affect. Good judgment and insight.

## 2023-05-23 NOTE — ED PROVIDER NOTE - NS ED ATTENDING STATEMENT MOD
This was a shared visit with the DENI. I reviewed and verified the documentation and independently performed the documented:

## 2023-05-24 LAB
ANION GAP SERPL CALC-SCNC: 16 MMOL/L — SIGNIFICANT CHANGE UP (ref 5–17)
BASE EXCESS BLDA CALC-SCNC: -0.5 MMOL/L — SIGNIFICANT CHANGE UP (ref -2–3)
BLD GP AB SCN SERPL QL: NEGATIVE — SIGNIFICANT CHANGE UP
BUN SERPL-MCNC: 14 MG/DL — SIGNIFICANT CHANGE UP (ref 7–23)
CA-I BLDA-SCNC: 1.13 MMOL/L — LOW (ref 1.15–1.33)
CALCIUM SERPL-MCNC: 8.3 MG/DL — LOW (ref 8.4–10.5)
CHLORIDE SERPL-SCNC: 104 MMOL/L — SIGNIFICANT CHANGE UP (ref 96–108)
CO2 BLDA-SCNC: 27 MMOL/L — HIGH (ref 19–24)
CO2 SERPL-SCNC: 20 MMOL/L — LOW (ref 22–31)
COHGB MFR BLDA: 1.6 % — SIGNIFICANT CHANGE UP
CREAT SERPL-MCNC: 0.66 MG/DL — SIGNIFICANT CHANGE UP (ref 0.5–1.3)
EGFR: 101 ML/MIN/1.73M2 — SIGNIFICANT CHANGE UP
GLUCOSE BLDA-MCNC: 126 MG/DL — HIGH (ref 70–99)
GLUCOSE SERPL-MCNC: 94 MG/DL — SIGNIFICANT CHANGE UP (ref 70–99)
HCG SERPL-ACNC: 1 MIU/ML — SIGNIFICANT CHANGE UP
HCO3 BLDA-SCNC: 26 MMOL/L — SIGNIFICANT CHANGE UP (ref 21–28)
HGB BLDA-MCNC: 10.8 G/DL — LOW (ref 11.7–16.1)
METHGB MFR BLDA: 0 % — SIGNIFICANT CHANGE UP
OXYHGB MFR BLDA: 97.6 % — HIGH (ref 90–95)
PCO2 BLDA: 49 MMHG — HIGH (ref 32–45)
PH BLDA: 7.33 — LOW (ref 7.35–7.45)
PO2 BLDA: 182 MMHG — HIGH (ref 83–108)
POTASSIUM BLDA-SCNC: 3.6 MMOL/L — SIGNIFICANT CHANGE UP (ref 3.5–5.1)
POTASSIUM SERPL-MCNC: 4.2 MMOL/L — SIGNIFICANT CHANGE UP (ref 3.5–5.3)
POTASSIUM SERPL-SCNC: 4.2 MMOL/L — SIGNIFICANT CHANGE UP (ref 3.5–5.3)
RH IG SCN BLD-IMP: POSITIVE — SIGNIFICANT CHANGE UP
SAO2 % BLDA: 99.2 % — HIGH (ref 94–98)
SODIUM BLDA-SCNC: 136 MMOL/L — SIGNIFICANT CHANGE UP (ref 136–145)
SODIUM SERPL-SCNC: 140 MMOL/L — SIGNIFICANT CHANGE UP (ref 135–145)
VIT D25+D1,25 OH+D1,25 PNL SERPL-MCNC: 98.4 PG/ML — HIGH (ref 19.9–79.3)

## 2023-05-24 DEVICE — SCREW LOKG 5X35MM STRL: Type: IMPLANTABLE DEVICE | Site: RIGHT | Status: FUNCTIONAL

## 2023-05-24 DEVICE — SCREW LOKG 5X45MM: Type: IMPLANTABLE DEVICE | Site: RIGHT | Status: FUNCTIONAL

## 2023-05-24 DEVICE — IMPLANTABLE DEVICE: Type: IMPLANTABLE DEVICE | Site: RIGHT | Status: FUNCTIONAL

## 2023-05-24 DEVICE — K-WIRE STRYKER (THREADED) 3.2MM X 400MM: Type: IMPLANTABLE DEVICE | Site: RIGHT | Status: FUNCTIONAL

## 2023-05-24 DEVICE — CABLE/SLV SET BEAD MED CABLE 2MM: Type: IMPLANTABLE DEVICE | Site: RIGHT | Status: FUNCTIONAL

## 2023-05-24 DEVICE — IMP VITOSS BB TRUMA FOAM PACK 1.2CC: Type: IMPLANTABLE DEVICE | Site: RIGHT | Status: FUNCTIONAL

## 2023-05-24 DEVICE — SCREW LOKG 5X50MM: Type: IMPLANTABLE DEVICE | Site: RIGHT | Status: FUNCTIONAL

## 2023-05-24 DEVICE — SCREW ALPHA LOKG 5X60MM STRL: Type: IMPLANTABLE DEVICE | Site: RIGHT | Status: FUNCTIONAL

## 2023-05-24 DEVICE — GUID WIRE INSTR: Type: IMPLANTABLE DEVICE | Site: RIGHT | Status: FUNCTIONAL

## 2023-05-24 RX ORDER — SODIUM CHLORIDE 9 MG/ML
1000 INJECTION, SOLUTION INTRAVENOUS
Refills: 0 | Status: DISCONTINUED | OUTPATIENT
Start: 2023-05-24 | End: 2023-05-30

## 2023-05-24 RX ORDER — CEFAZOLIN SODIUM 1 G
2000 VIAL (EA) INJECTION EVERY 8 HOURS
Refills: 0 | Status: COMPLETED | OUTPATIENT
Start: 2023-05-25 | End: 2023-05-25

## 2023-05-24 RX ORDER — SENNA PLUS 8.6 MG/1
2 TABLET ORAL AT BEDTIME
Refills: 0 | Status: DISCONTINUED | OUTPATIENT
Start: 2023-05-24 | End: 2023-06-02

## 2023-05-24 RX ORDER — HYDROMORPHONE HYDROCHLORIDE 2 MG/ML
0.5 INJECTION INTRAMUSCULAR; INTRAVENOUS; SUBCUTANEOUS
Refills: 0 | Status: DISCONTINUED | OUTPATIENT
Start: 2023-05-24 | End: 2023-05-30

## 2023-05-24 RX ORDER — ASPIRIN/CALCIUM CARB/MAGNESIUM 324 MG
325 TABLET ORAL DAILY
Refills: 0 | Status: DISCONTINUED | OUTPATIENT
Start: 2023-05-24 | End: 2023-05-25

## 2023-05-24 RX ORDER — OXYCODONE HYDROCHLORIDE 5 MG/1
5 TABLET ORAL EVERY 4 HOURS
Refills: 0 | Status: DISCONTINUED | OUTPATIENT
Start: 2023-05-24 | End: 2023-05-31

## 2023-05-24 RX ORDER — ACETAMINOPHEN 500 MG
975 TABLET ORAL EVERY 8 HOURS
Refills: 0 | Status: DISCONTINUED | OUTPATIENT
Start: 2023-05-24 | End: 2023-06-02

## 2023-05-24 RX ORDER — MAGNESIUM HYDROXIDE 400 MG/1
30 TABLET, CHEWABLE ORAL DAILY
Refills: 0 | Status: DISCONTINUED | OUTPATIENT
Start: 2023-05-24 | End: 2023-06-02

## 2023-05-24 RX ORDER — OXYCODONE HYDROCHLORIDE 5 MG/1
10 TABLET ORAL EVERY 4 HOURS
Refills: 0 | Status: DISCONTINUED | OUTPATIENT
Start: 2023-05-24 | End: 2023-05-31

## 2023-05-24 RX ORDER — HYDROMORPHONE HYDROCHLORIDE 2 MG/ML
0.5 INJECTION INTRAMUSCULAR; INTRAVENOUS; SUBCUTANEOUS ONCE
Refills: 0 | Status: DISCONTINUED | OUTPATIENT
Start: 2023-05-24 | End: 2023-05-24

## 2023-05-24 RX ORDER — POLYETHYLENE GLYCOL 3350 17 G/17G
17 POWDER, FOR SOLUTION ORAL AT BEDTIME
Refills: 0 | Status: DISCONTINUED | OUTPATIENT
Start: 2023-05-24 | End: 2023-06-02

## 2023-05-24 RX ORDER — PANTOPRAZOLE SODIUM 20 MG/1
40 TABLET, DELAYED RELEASE ORAL
Refills: 0 | Status: DISCONTINUED | OUTPATIENT
Start: 2023-05-24 | End: 2023-06-02

## 2023-05-24 RX ORDER — ONDANSETRON 8 MG/1
4 TABLET, FILM COATED ORAL EVERY 6 HOURS
Refills: 0 | Status: DISCONTINUED | OUTPATIENT
Start: 2023-05-24 | End: 2023-06-02

## 2023-05-24 RX ADMIN — Medication 325 MILLIGRAM(S): at 23:27

## 2023-05-24 RX ADMIN — OXYCODONE HYDROCHLORIDE 10 MILLIGRAM(S): 5 TABLET ORAL at 05:40

## 2023-05-24 RX ADMIN — OXYCODONE HYDROCHLORIDE 10 MILLIGRAM(S): 5 TABLET ORAL at 06:40

## 2023-05-24 RX ADMIN — OXYCODONE HYDROCHLORIDE 10 MILLIGRAM(S): 5 TABLET ORAL at 11:19

## 2023-05-24 RX ADMIN — HYDROMORPHONE HYDROCHLORIDE 0.2 MILLIGRAM(S): 2 INJECTION INTRAMUSCULAR; INTRAVENOUS; SUBCUTANEOUS at 08:31

## 2023-05-24 RX ADMIN — HYDROMORPHONE HYDROCHLORIDE 0.2 MILLIGRAM(S): 2 INJECTION INTRAMUSCULAR; INTRAVENOUS; SUBCUTANEOUS at 09:31

## 2023-05-24 RX ADMIN — CHLORHEXIDINE GLUCONATE 1 APPLICATION(S): 213 SOLUTION TOPICAL at 07:16

## 2023-05-24 RX ADMIN — HYDROMORPHONE HYDROCHLORIDE 0.5 MILLIGRAM(S): 2 INJECTION INTRAMUSCULAR; INTRAVENOUS; SUBCUTANEOUS at 13:45

## 2023-05-24 RX ADMIN — HYDROMORPHONE HYDROCHLORIDE 0.5 MILLIGRAM(S): 2 INJECTION INTRAMUSCULAR; INTRAVENOUS; SUBCUTANEOUS at 14:15

## 2023-05-24 RX ADMIN — POLYETHYLENE GLYCOL 3350 17 GRAM(S): 17 POWDER, FOR SOLUTION ORAL at 23:27

## 2023-05-24 RX ADMIN — HYDROMORPHONE HYDROCHLORIDE 0.2 MILLIGRAM(S): 2 INJECTION INTRAMUSCULAR; INTRAVENOUS; SUBCUTANEOUS at 01:30

## 2023-05-24 RX ADMIN — SODIUM CHLORIDE 100 MILLILITER(S): 9 INJECTION, SOLUTION INTRAVENOUS at 06:22

## 2023-05-24 RX ADMIN — SENNA PLUS 2 TABLET(S): 8.6 TABLET ORAL at 23:27

## 2023-05-24 RX ADMIN — OXYCODONE HYDROCHLORIDE 10 MILLIGRAM(S): 5 TABLET ORAL at 23:28

## 2023-05-24 RX ADMIN — HYDROMORPHONE HYDROCHLORIDE 0.2 MILLIGRAM(S): 2 INJECTION INTRAMUSCULAR; INTRAVENOUS; SUBCUTANEOUS at 01:45

## 2023-05-24 RX ADMIN — OXYCODONE HYDROCHLORIDE 10 MILLIGRAM(S): 5 TABLET ORAL at 12:19

## 2023-05-24 NOTE — PROGRESS NOTE ADULT - SUBJECTIVE AND OBJECTIVE BOX
Ortho Post Op Check    Procedure: R IMN w/ cerclage wire   Surgeon: Kelsey Huerta comfortable without complaints, pain controlled  Denies CP, SOB, N/V, numbness/tingling     Vital Signs Last 24 Hrs  T(C): 36.4 (05-24-23 @ 15:34), Max: 36.4 (05-24-23 @ 15:34)  T(F): --  HR: 59 (05-24-23 @ 15:34) (59 - 59)  BP: 122/76 (05-24-23 @ 15:34) (122/76 - 122/76)  BP(mean): 91 (05-24-23 @ 15:34) (91 - 91)  RR: 16 (05-24-23 @ 15:34) (16 - 16)  SpO2: 93% (05-24-23 @ 15:34) (93% - 93%)  I&O's Summary    24 May 2023 07:01  -  24 May 2023 21:07  --------------------------------------------------------  IN: 0 mL / OUT: 500 mL / NET: -500 mL        General: Pt Alert and oriented, NAD  DSG C/D/I x2 alfred  B/L LE: sensation intact, DP 2+, brisk cap refill, EHL/FHL/TA/GS firing bilaterally                          12.9   11.18 )-----------( 247      ( 23 May 2023 15:49 )             40.1     05-24    140  |  104  |  14  ----------------------------<  94  4.2   |  20<L>  |  0.66    Ca    8.3<L>      24 May 2023 05:30        Post-op X-Ray: pending    A/P: 57yFemale POD#0 s/p R IMN w/ cerclage wire   - Stable  - Pain Control  - DVT ppx: asa 325 bid  - Post op abx: ancef  - PT, WBS: rle wbat    Ortho Pager [phone]

## 2023-05-24 NOTE — PROGRESS NOTE ADULT - SUBJECTIVE AND OBJECTIVE BOX
Ortho Note    Pt comfortable without complaints, pain controlled  Denies CP, SOB, N/V, numbness/tingling. NAEON    Vital Signs Last 24 Hrs  T(C): 36.7 (05-24-23 @ 05:43), Max: 36.7 (05-24-23 @ 05:43)  T(F): 98 (05-24-23 @ 05:43), Max: 98 (05-24-23 @ 05:43)  HR: 57 (05-24-23 @ 05:43) (57 - 57)  BP: 137/- (05-24-23 @ 05:43) (137/- - 137/-)  BP(mean): --  RR: 18 (05-24-23 @ 05:43) (18 - 18)  SpO2: 94% (05-24-23 @ 05:43) (94% - 94%)  I&O's Summary    Physical Exam: AVSS  Gen: NAD, AOx3    RLE  Skin intact no open injuries; s, R thigh pain  Unable to SLR, PF/DF intact, toes mobile  EHL/TA/GS intact to motor  Pain with log roll or axial loading  SILT Saph/amalia/sp/dp/tib  Palpable DP & PT  Brisk cap refill distall                          12.9   11.18 )-----------( 247      ( 23 May 2023 15:49 )             40.1     05-24    140  |  104  |  14  ----------------------------<  94  4.2   |  20<L>  |  0.66    Ca    8.3<L>      24 May 2023 05:30      A/P: 57yFemale w/ R periprosthetic femur fracture  - Med clearance/Anesthesia clearance  - Pre-op labs and imaging - CXR, EKG,CBC, BM, T&S/ABO, PT/PTT/INR, +/- pregnancy test  - NPO   - NWB RLE  - AC per primary  - Analgesia PRN per primary  - B/l SCDs   Ortho Note    Pt comfortable without complaints, pain controlled  Denies CP, SOB, N/V, numbness/tingling. NAEON    Vital Signs Last 24 Hrs  T(C): 36.7 (05-24-23 @ 05:43), Max: 36.7 (05-24-23 @ 05:43)  T(F): 98 (05-24-23 @ 05:43), Max: 98 (05-24-23 @ 05:43)  HR: 57 (05-24-23 @ 05:43) (57 - 57)  BP: 137/- (05-24-23 @ 05:43) (137/- - 137/-)  BP(mean): --  RR: 18 (05-24-23 @ 05:43) (18 - 18)  SpO2: 94% (05-24-23 @ 05:43) (94% - 94%)  I&O's Summary    Physical Exam: AVSS  Gen: NAD, AOx3    RLE  Skin intact no open injuries; s, R thigh pain  Unable to SLR, PF/DF intact, toes mobile  EHL/TA/GS intact to motor  Pain with log roll or axial loading  SILT Saph/amalia/sp/dp/tib  Palpable DP & PT  Brisk cap refill distally                          12.9   11.18 )-----------( 247      ( 23 May 2023 15:49 )             40.1     05-24    140  |  104  |  14  ----------------------------<  94  4.2   |  20<L>  |  0.66    Ca    8.3<L>      24 May 2023 05:30      A/P: 57yFemale w/ R periprosthetic femur fracture  - Med clearance/Anesthesia clearance  - Pre-op labs and imaging - CXR, EKG,CBC, BM, T&S/ABO, PT/PTT/INR, +/- pregnancy test  - NPO   - NWB RLE  - AC per primary  - Analgesia PRN per primary  - B/l SCDs

## 2023-05-24 NOTE — PRE-OP CHECKLIST - ALLERGIES REVIEWED
Problem: Skin Integrity:  Goal: Absence of new skin breakdown  Description: Absence of new skin breakdown  Outcome: Met This Shift     Problem: Falls - Risk of:  Goal: Will remain free from falls  Description: Will remain free from falls  Outcome: Met This Shift  Goal: Absence of physical injury  Description: Absence of physical injury  Outcome: Met This Shift     Problem: Injury - Risk of, Physical Injury:  Goal: Will remain free from falls  Description: Will remain free from falls  Outcome: Met This Shift  Goal: Absence of physical injury  Description: Absence of physical injury  Outcome: Met This Shift     Problem: Pain:  Goal: Pain level will decrease  Description: Pain level will decrease  Outcome: Met This Shift     Problem: Physical Regulation:  Goal: Postoperative complications will be avoided or minimized  Description: Postoperative complications will be avoided or minimized  Outcome: Met This Shift     Problem: Skin Integrity:  Goal: Will show no infection signs and symptoms  Description: Will show no infection signs and symptoms  Outcome: Ongoing     Problem: Confusion - Acute:  Goal: Mental status will be restored to baseline  Description: Mental status will be restored to baseline  Outcome: Ongoing     Problem: Discharge Planning:  Goal: Discharged to appropriate level of care  Description: Discharged to appropriate level of care  Outcome: Ongoing     Problem: Physical Regulation:  Goal: Ability to maintain a body temperature in the normal range will improve  Description: Ability to maintain a body temperature in the normal range will improve  Outcome: Ongoing     Problem: Nutrition  Goal: Optimal nutrition therapy  Outcome: Ongoing     Problem: Musculor/Skeletal Functional Status  Goal: Highest potential functional level  Outcome: Ongoing done

## 2023-05-24 NOTE — PACU DISCHARGE NOTE - AIRWAY PATENCY:
Rob Freeman is a 35year old male.  Patient presents with:  Ear Pain: left ear pain and pressure for about 4-5 weeks, pt also noticed ringing in the left ear,  pt was seen in the immediate care on 5/18/2021     HPI:   He has been experiencing a pressure Satisfactory Tonsils - Normal, Tongue - Normal    Nasal Normal External nose - Normal. Nasal septum - Normal, Turbinates - Normal   Neurological Normal Memory - Normal. Cranial nerves - Cranial nerves II through XII grossly intact.    Neck Exam Normal Inspection - Beatrice Sears

## 2023-05-24 NOTE — PRE-OP CHECKLIST - IV STARTED
Internal Medicine Daily Progress Note    Patient: Michael Wolfe 63 year old male Date: 5/21/2021   YOB: 1958 Attending: Cuong Shukla DO     Chief Complaint: Cough      Subjective:   The patient continues to have difficulty breathing with a cough  Treatment plan explained      Review of Systems: A 12 point review of systems is otherwise negative    Medications:  Scheduled  • atorvastatin  10 mg Oral Daily   • sodium chloride (PF)  2 mL Intracatheter 2 times per day   • heparin (porcine)  5,000 Units Subcutaneous 3 times per day   • guaiFENesin  1,200 mg Oral 2 times per day   • predniSONE  40 mg Oral Daily with breakfast   • cefTRIAXone (ROCEPHIN) IV  2,000 mg Intravenous Nightly   • azithromycin  500 mg Intravenous Nightly       PRN  HYDROcodone-acetaminophen, hydrALAZINE, sodium chloride, acetaminophen **OR** acetaminophen, ondansetron **OR** ondansetron, albuterol, morphine injection    Continuous infusion  • lactated ringers infusion         Allergies:  ALLERGIES:  No Known Allergies    Physical Examination:  Vitals:   Vitals:    05/21/21 0330 05/21/21 0500 05/21/21 0530 05/21/21 0600   BP:  131/63     BP Location:       Patient Position:       Pulse: 82 81 73 79   Resp:  18 18 18   Temp:       TempSrc:       SpO2: 97% 96% 95% 96%     Constitutional: nontoxic appearance, well-groomed, well-developed  Eyes: extraocular movements intact, pupils react to light appropriately, conjunctivae and eyelids appear normal  Ears, Nose, Mouth and Throat:  hearing appears normal, oropharynx moist and clear  Neck: no masses noted, thyroid does not appear enlarged  Respiratory: No use of accessory muscles, +oxygen, wheezing/coughing throughout  Cardiovascular: Normal rate, normal rhythm, no lower extremity edema, pedal pulses 2+ bilaterally  Gastrointestinal: nontender, no hepatomegaly noted, no splenomegaly noted, no hernias noted  Genitourinary: deferred  Lymphatic: no lymphadenopathy noted in cervical or  supraclavicular zones  Musculoskeletal: range of motion RUE, LUE, RLE, LLE intact without pain, strength 5/5 RUE, LUE, RLE, LLE with normal tone  Skin: no rashes or lesions noted, no tightening of skin noted  Neurologic: CN II-XII intact, sensation to touch intact  Psychiatric: alert and oriented to person, place, and time. mood and affect appear normal    Laboratory data:  Recent Labs   Lab 05/20/21 2209 05/20/21  2158   WBC  --  14.8*   HCT  --  39.7   HGB  --  13.5   PLT  --  329   SODIUM  --  130*  131*   POTASSIUM  --  3.5  3.3*   CHLORIDE  --  94*  93*   CO2  --  27  27   BUN  --  12  12   CREATININE 1.10 0.81  0.82   GLUCOSE  --  89  98   CALCIUM  --  8.6  9.0   GPT  --  24  22   ALKPT  --  68  59   BILIRUBIN  --  0.5  0.5   ALBUMIN  --  3.9  3.7       Diagnostic Studies:  CT Chest PE Imaging   Final Result   IMPRESSION:      1.  No evidence of pulmonary embolism.   2.  Mild diffuse bronchial wall thickening which can be seen in acute   asthma or bronchitis, other etiologies.   3.  Severe coronary artery calcifications.      I, Attending Radiologist Jean-Paul Ewing MD, have reviewed the images and   report and concur with these findings interpreted by Resident Radiologist,   Cesar Knapp MD.       XR CHEST AP OR PA - PORTABLE   Final Result   FINDINGS/IMPRESSION:       Cardiomediastinal silhouette is stable, probably within normal limits for   the portable technique.  Pulmonary vasculature slightly prominent   centrally.  Mild hazy opacity at the left lung base.  Cannot exclude a mild   degree of pulmonary vascular congestion.  No dense airspace disease.  No   sizable pleural effusion on this single view.  No pneumothorax.      Postoperative changes are demonstrated within the low thoracic and upper   lumbar spine, incompletely imaged.  Neurostimulator leads are demonstrated   projecting over the low thoracic region.                            Imaging Results Reviewed:  XR CHEST AP OR PA -  PORTABLE    Result Date: 5/20/2021  AP PORTABLE UPRIGHT CHEST 22:47 HOURS CLINICAL INDICATION: sob  COMPARISON: 2/8/2017     FINDINGS/IMPRESSION: Cardiomediastinal silhouette is stable, probably within normal limits for the portable technique.  Pulmonary vasculature slightly prominent centrally.  Mild hazy opacity at the left lung base.  Cannot exclude a mild degree of pulmonary vascular congestion.  No dense airspace disease.  No sizable pleural effusion on this single view.  No pneumothorax. Postoperative changes are demonstrated within the low thoracic and upper lumbar spine, incompletely imaged.  Neurostimulator leads are demonstrated projecting over the low thoracic region.     CT Chest PE Imaging    Result Date: 5/21/2021  CT ANGIOGRAM CHEST PE IMAGING- 3D HISTORY: PE suspected, low/intermediate prob, positive D-dimer COMPARISON: Chest radiograph 5/20/2021. TECHNIQUE:  Axial CT angiogram of the chest with 60 mL Omnipaque 350 IV contrast, per the CT pulmonary angiogram protocol. Multiplanar reformats with axial MIP images. FINDINGS: There is good opacification of the pulmonary arteries, but evaluation of subsegmental branches in the lower lobes is limited by motion artifact. The study is felt diagnostic to the level of the subsegmental pulmonary arteries. Vascular:  There are no filling defects identified to suggest pulmonary embolism. Main pulmonary artery is dilated to 3.7 cm in diameter. The thoracic aorta is normal in caliber. Lungs:  Mild diffuse bronchial wall thickening. Pleura:  Unremarkable. Smita/mediastinum:  Unremarkable. Heart/pericardium:  The heart is normal in size. No pericardial effusion or thickening. There is no evidence of right heart strain. Severe coronary artery calcifications. Moderate mitral annular calcifications. Mild aortic valvular calcifications. Axilla/supraclavicular regions:  Unremarkable. Osseous structures/subcutaneous tissues:  Moderate multilevel degenerative spondylosis.  Partially imaged posterior spinal fixation hardware at T10-12. Upper abdomen:  Unremarkable.     IMPRESSION: 1.  No evidence of pulmonary embolism. 2.  Mild diffuse bronchial wall thickening which can be seen in acute asthma or bronchitis, other etiologies. 3.  Severe coronary artery calcifications.      Assessment and Plan:    Sepsis  Acute bronchitis  Pneumonia, acute requiring management  Acute exacerbation of COPD  Acute hypoxic respiratory failure  Severe coronary artery calcifications  Tobacco abuse  Hypertension  Hyperlipidemia  Mild hyponatremia  Chronic low back pain  - Labs and imaging reviewed  - Further labs ordered for monitoring  - Rapid COVID not detected  - Statin  - Ceftriaxone/azithro for pneumonia  - Continue prednisone for COPD  - Continue inhalers  - Blood cultures NGTD  - Wean oxygen to maintain saturation at 88% and above        Diet: regular  DVT/VTE Prophylaxis: heparin  Code Status: Selective Treatment/DNR    JULIEN: pending   Barriers: wean oxygen, steroids, IV abx, improvement of breathing  Destination: home    Zack Cook MD  [facility] Hospitalist  Please contact the attending Hospitalist from 7AM until 7pm.   From 7pm to 7am please contact the Hospitalist on call at [phone]     Left/yes

## 2023-05-24 NOTE — PRE-OP CHECKLIST - SELECT TESTS ORDERED
BMP/CBC/CMP/Type and Screen/EKG/POCT Blood Glucose BMP/CBC/CMP/PT/PTT/INR/Type and Screen/EKG/POCT Blood Glucose

## 2023-05-24 NOTE — PRE-ANESTHESIA EVALUATION ADULT - NSANTHPMHFT_GEN_ALL_CORE
Patient is a 57y old  Female s/p r TKA PMHx HLD who presents today w/ distal 1/3 Femoral shaft fracture sustained during mechanical fall onto her R side around 1pm this morning while in the shower. Patient presents today with persistent and constant pain. Fall appears to have been mechanical in nature. Patient felt no prodrome. Patient did not lose consciousness. Patient has no significant cardiac or pulmonary Hx. Patient was able to climb a flight of stairs prior to her fall without getting short of breath. Home medications include rosuvastatin 5 mg PO QD and celecoxib 200 mg PO QD. EKG NSR with low voltage likely due to body habitus. chest X ray shows no acute pathology. Patient tolerated recent anesthesia and surgery well.

## 2023-05-25 LAB
ANION GAP SERPL CALC-SCNC: 14 MMOL/L — SIGNIFICANT CHANGE UP (ref 5–17)
BUN SERPL-MCNC: 16 MG/DL — SIGNIFICANT CHANGE UP (ref 7–23)
CALCIUM SERPL-MCNC: 7.6 MG/DL — LOW (ref 8.4–10.5)
CHLORIDE SERPL-SCNC: 101 MMOL/L — SIGNIFICANT CHANGE UP (ref 96–108)
CO2 SERPL-SCNC: 20 MMOL/L — LOW (ref 22–31)
CREAT SERPL-MCNC: 0.66 MG/DL — SIGNIFICANT CHANGE UP (ref 0.5–1.3)
EGFR: 101 ML/MIN/1.73M2 — SIGNIFICANT CHANGE UP
GAS PNL BLDA: SIGNIFICANT CHANGE UP
GLUCOSE SERPL-MCNC: 156 MG/DL — HIGH (ref 70–99)
HCT VFR BLD CALC: 28.8 % — LOW (ref 34.5–45)
HGB BLD-MCNC: 8.8 G/DL — LOW (ref 11.5–15.5)
MAGNESIUM SERPL-MCNC: 1.6 MG/DL — SIGNIFICANT CHANGE UP (ref 1.6–2.6)
MCHC RBC-ENTMCNC: 29.9 PG — SIGNIFICANT CHANGE UP (ref 27–34)
MCHC RBC-ENTMCNC: 30.6 GM/DL — LOW (ref 32–36)
MCV RBC AUTO: 98 FL — SIGNIFICANT CHANGE UP (ref 80–100)
NRBC # BLD: 0 /100 WBCS — SIGNIFICANT CHANGE UP (ref 0–0)
PHOSPHATE SERPL-MCNC: 4.7 MG/DL — HIGH (ref 2.5–4.5)
PLATELET # BLD AUTO: 155 K/UL — SIGNIFICANT CHANGE UP (ref 150–400)
POTASSIUM SERPL-MCNC: 4.5 MMOL/L — SIGNIFICANT CHANGE UP (ref 3.5–5.3)
POTASSIUM SERPL-SCNC: 4.5 MMOL/L — SIGNIFICANT CHANGE UP (ref 3.5–5.3)
RBC # BLD: 2.94 M/UL — LOW (ref 3.8–5.2)
RBC # FLD: 13.3 % — SIGNIFICANT CHANGE UP (ref 10.3–14.5)
SODIUM SERPL-SCNC: 135 MMOL/L — SIGNIFICANT CHANGE UP (ref 135–145)
TROPONIN T SERPL-MCNC: 0.01 NG/ML — SIGNIFICANT CHANGE UP (ref 0–0.01)
WBC # BLD: 11.67 K/UL — HIGH (ref 3.8–10.5)
WBC # FLD AUTO: 11.67 K/UL — HIGH (ref 3.8–10.5)

## 2023-05-25 PROCEDURE — 73552 X-RAY EXAM OF FEMUR 2/>: CPT | Mod: 26,RT

## 2023-05-25 PROCEDURE — 99233 SBSQ HOSP IP/OBS HIGH 50: CPT

## 2023-05-25 PROCEDURE — 71045 X-RAY EXAM CHEST 1 VIEW: CPT | Mod: 26

## 2023-05-25 RX ORDER — ROSUVASTATIN CALCIUM 5 MG/1
1 TABLET ORAL
Refills: 0 | DISCHARGE

## 2023-05-25 RX ORDER — ESCITALOPRAM OXALATE 10 MG/1
20 TABLET, FILM COATED ORAL DAILY
Refills: 0 | Status: DISCONTINUED | OUTPATIENT
Start: 2023-05-25 | End: 2023-06-02

## 2023-05-25 RX ORDER — TIZANIDINE 4 MG/1
1 TABLET ORAL
Refills: 0 | DISCHARGE

## 2023-05-25 RX ORDER — GABAPENTIN 400 MG/1
800 CAPSULE ORAL AT BEDTIME
Refills: 0 | Status: DISCONTINUED | OUTPATIENT
Start: 2023-05-25 | End: 2023-06-02

## 2023-05-25 RX ORDER — SIMETHICONE 80 MG/1
80 TABLET, CHEWABLE ORAL ONCE
Refills: 0 | Status: COMPLETED | OUTPATIENT
Start: 2023-05-25 | End: 2023-05-25

## 2023-05-25 RX ORDER — ASPIRIN/CALCIUM CARB/MAGNESIUM 324 MG
325 TABLET ORAL
Refills: 0 | Status: DISCONTINUED | OUTPATIENT
Start: 2023-05-25 | End: 2023-06-02

## 2023-05-25 RX ORDER — ATORVASTATIN CALCIUM 80 MG/1
20 TABLET, FILM COATED ORAL AT BEDTIME
Refills: 0 | Status: DISCONTINUED | OUTPATIENT
Start: 2023-05-25 | End: 2023-06-02

## 2023-05-25 RX ORDER — LANOLIN ALCOHOL/MO/W.PET/CERES
5 CREAM (GRAM) TOPICAL AT BEDTIME
Refills: 0 | Status: DISCONTINUED | OUTPATIENT
Start: 2023-05-25 | End: 2023-06-02

## 2023-05-25 RX ORDER — SIMETHICONE 80 MG/1
80 TABLET, CHEWABLE ORAL EVERY 8 HOURS
Refills: 0 | Status: DISCONTINUED | OUTPATIENT
Start: 2023-05-25 | End: 2023-06-02

## 2023-05-25 RX ADMIN — Medication 325 MILLIGRAM(S): at 18:02

## 2023-05-25 RX ADMIN — SIMETHICONE 80 MILLIGRAM(S): 80 TABLET, CHEWABLE ORAL at 06:47

## 2023-05-25 RX ADMIN — OXYCODONE HYDROCHLORIDE 10 MILLIGRAM(S): 5 TABLET ORAL at 14:46

## 2023-05-25 RX ADMIN — OXYCODONE HYDROCHLORIDE 10 MILLIGRAM(S): 5 TABLET ORAL at 22:04

## 2023-05-25 RX ADMIN — ATORVASTATIN CALCIUM 20 MILLIGRAM(S): 80 TABLET, FILM COATED ORAL at 22:02

## 2023-05-25 RX ADMIN — Medication 975 MILLIGRAM(S): at 02:45

## 2023-05-25 RX ADMIN — Medication 975 MILLIGRAM(S): at 18:02

## 2023-05-25 RX ADMIN — Medication 100 MILLIGRAM(S): at 01:53

## 2023-05-25 RX ADMIN — Medication 975 MILLIGRAM(S): at 09:56

## 2023-05-25 RX ADMIN — PANTOPRAZOLE SODIUM 40 MILLIGRAM(S): 20 TABLET, DELAYED RELEASE ORAL at 06:01

## 2023-05-25 RX ADMIN — ESCITALOPRAM OXALATE 20 MILLIGRAM(S): 10 TABLET, FILM COATED ORAL at 12:18

## 2023-05-25 RX ADMIN — Medication 100 MILLIGRAM(S): at 09:56

## 2023-05-25 RX ADMIN — Medication 30 MILLILITER(S): at 09:25

## 2023-05-25 RX ADMIN — Medication 30 MILLILITER(S): at 14:22

## 2023-05-25 RX ADMIN — Medication 975 MILLIGRAM(S): at 01:45

## 2023-05-25 RX ADMIN — OXYCODONE HYDROCHLORIDE 10 MILLIGRAM(S): 5 TABLET ORAL at 15:46

## 2023-05-25 RX ADMIN — OXYCODONE HYDROCHLORIDE 10 MILLIGRAM(S): 5 TABLET ORAL at 23:04

## 2023-05-25 RX ADMIN — Medication 325 MILLIGRAM(S): at 09:56

## 2023-05-25 RX ADMIN — OXYCODONE HYDROCHLORIDE 10 MILLIGRAM(S): 5 TABLET ORAL at 00:28

## 2023-05-25 RX ADMIN — GABAPENTIN 800 MILLIGRAM(S): 400 CAPSULE ORAL at 22:02

## 2023-05-25 RX ADMIN — Medication 1 TABLET(S): at 12:19

## 2023-05-25 RX ADMIN — OXYCODONE HYDROCHLORIDE 10 MILLIGRAM(S): 5 TABLET ORAL at 07:13

## 2023-05-25 RX ADMIN — OXYCODONE HYDROCHLORIDE 10 MILLIGRAM(S): 5 TABLET ORAL at 08:13

## 2023-05-25 NOTE — PROGRESS NOTE ADULT - SUBJECTIVE AND OBJECTIVE BOX
SUBJECTIVE: Patient seen and examined. Pt doing well o/n.  No f/c/n/v/cp/sob.     OBJECTIVE:  Vital Signs Last 24 Hrs  T(C): 36.9 (25 May 2023 16:05), Max: 36.9 (25 May 2023 16:05)  T(F): 98.5 (25 May 2023 16:05), Max: 98.5 (25 May 2023 16:05)  HR: 65 (25 May 2023 16:05) (65 - 92)  BP: 115/71 (25 May 2023 16:05) (101/75 - 165/99)  BP(mean): 89 (25 May 2023 08:25) (74 - 144)  RR: 17 (25 May 2023 16:05) (10 - 19)  SpO2: 97% (25 May 2023 16:05) (87% - 97%)    Parameters below as of 25 May 2023 16:05  Patient On (Oxygen Delivery Method): room air        Affected extremity:          Dressing: clean/dry/intact            Sensation: SILT         Motor exam: 5/5 TA/GS/EHL         warm well perfused; capillary refill <3 seconds     LABS:                        8.8    11.67 )-----------( 155      ( 25 May 2023 12:25 )             28.8     05-25    135  |  101  |  16  ----------------------------<  156<H>  4.5   |  20<L>  |  0.66    Ca    7.6<L>      25 May 2023 05:59  Phos  4.7     05-25  Mg     1.6     05-25          MEDICATIONS:acetaminophen     Tablet .. 975 milliGRAM(s) Oral every 8 hours  aluminum hydroxide/magnesium hydroxide/simethicone Suspension 30 milliLiter(s) Oral every 4 hours PRN  aspirin enteric coated 325 milliGRAM(s) Oral two times a day  atorvastatin 20 milliGRAM(s) Oral at bedtime  chlorhexidine 2% Cloths 1 Application(s) Topical <User Schedule>  escitalopram 20 milliGRAM(s) Oral daily  gabapentin 800 milliGRAM(s) Oral at bedtime  HYDROmorphone  Injectable 0.2 milliGRAM(s) IV Push every 6 hours PRN  HYDROmorphone  Injectable 0.5 milliGRAM(s) IV Push every 15 minutes PRN  lactated ringers. 1000 milliLiter(s) IV Continuous <Continuous>  magnesium hydroxide Suspension 30 milliLiter(s) Oral daily PRN  multivitamin 1 Tablet(s) Oral daily  ondansetron Injectable 4 milliGRAM(s) IV Push every 6 hours PRN  oxyCODONE    IR 5 milliGRAM(s) Oral every 4 hours PRN  oxyCODONE    IR 10 milliGRAM(s) Oral every 4 hours PRN  pantoprazole    Tablet 40 milliGRAM(s) Oral before breakfast  polyethylene glycol 3350 17 Gram(s) Oral at bedtime  senna 2 Tablet(s) Oral at bedtime  simethicone 80 milliGRAM(s) Chew every 8 hours PRN    Anticoagulation:  aspirin enteric coated 325 milliGRAM(s) Oral two times a day    Antibiotics:     Pain medications:   acetaminophen     Tablet .. 975 milliGRAM(s) Oral every 8 hours  escitalopram 20 milliGRAM(s) Oral daily  gabapentin 800 milliGRAM(s) Oral at bedtime  HYDROmorphone  Injectable 0.2 milliGRAM(s) IV Push every 6 hours PRN  HYDROmorphone  Injectable 0.5 milliGRAM(s) IV Push every 15 minutes PRN  ondansetron Injectable 4 milliGRAM(s) IV Push every 6 hours PRN  oxyCODONE    IR 5 milliGRAM(s) Oral every 4 hours PRN  oxyCODONE    IR 10 milliGRAM(s) Oral every 4 hours PRN    A/P :  Pt is a 56yo Female s/p  POD # 1 ORIF right Femur  - follow medicine advice for chest issues  -    Pain control  -    DVT ppx: ASA     -    Weight bearing status: PWB  -    Physical Therapy  -    Dispo: Home/rehab depending on her recovery

## 2023-05-25 NOTE — OCCUPATIONAL THERAPY INITIAL EVALUATION ADULT - LEVEL OF INDEPENDENCE: DRESS UPPER BODY, OT EVAL
don back gown sitting EOB, requiring Min Ax1 2/2 impaired ability to weight shift in order to perform functional reach./minimum assist (75% patients effort)

## 2023-05-25 NOTE — PHYSICAL THERAPY INITIAL EVALUATION ADULT - GAIT DEVIATIONS NOTED, PT EVAL
Patient PWB 50% for RLE, however, presents with anxious and fearful behavior and prefers to demo TTWB RLE. Shuffling mechanics 2/2 impaired weight shifting ability./decreased francia/decreased velocity of limb motion/decreased weight-shifting ability

## 2023-05-25 NOTE — DISCHARGE NOTE PROVIDER - CARE PROVIDER_API CALL
Gunnar Cole; MBBS)  [facility]  [address] [place], [place] [address]  Phone: [phone]  Fax: [phone]  Follow Up Time: 2 weeks

## 2023-05-25 NOTE — OCCUPATIONAL THERAPY INITIAL EVALUATION ADULT - DIAGNOSIS, OT EVAL
Pt presenting with impaired strength, balance, and functional activity tolerance, impacting ability to perform functional mobility/ADLs.

## 2023-05-25 NOTE — PHYSICAL THERAPY INITIAL EVALUATION ADULT - TRANSFER SAFETY CONCERNS NOTED: SIT/STAND, REHAB EVAL
Performed sit<>stand x 2 trials. Demo fair eccentric control during descend/decreased weight-shifting ability

## 2023-05-25 NOTE — DISCHARGE NOTE PROVIDER - NSDCCPCAREPLAN_GEN_ALL_CORE_FT
PRINCIPAL DISCHARGE DIAGNOSIS  Diagnosis: Fracture of right femur  Assessment and Plan of Treatment:      PRINCIPAL DISCHARGE DIAGNOSIS  Diagnosis: Fracture of right femur  Assessment and Plan of Treatment: s/p Right femur IMN

## 2023-05-25 NOTE — OCCUPATIONAL THERAPY INITIAL EVALUATION ADULT - GENERAL OBSERVATIONS, REHAB EVAL
OT IE completed. Pt admitted to [facility] with R femur fx, now s/p R IMN (5/24). Orders received, chart reviewed, pt cleared for OT by DOM Barron. Pt received semi supine in bed, NAD, +heplock, +YINA, +purewick, +RLE dressing C/D/I, +RLE PWB (50%). Pt A&Ox4, agreeable to OT, and tolerated session well.

## 2023-05-25 NOTE — PHYSICAL THERAPY INITIAL EVALUATION ADULT - ADDITIONAL COMMENTS
Active community ambulator who lives with her 28 yo daughter in elevator access apartment, no MARCO ANTONIO, +ramp access to enter. Ambulates without AD and states she is independent with all ADLs prior. Of note, patient owns rollator for use and has been using RW since recent R TKA in April 2023

## 2023-05-25 NOTE — DISCHARGE NOTE PROVIDER - NSDCFUADDINST_GEN_ALL_CORE_FT
ACTIVITY:     - 50% partial weight-bearing on right leg. No strenuous activity, heavy lifting, driving or returning to work until cleared by MD.     - Apply a cold compress to the surgical site several times daily to reduce pain and swelling. For icing, twenty-minute sessions followed by an hour off is recommended. You should ice as frequently as possible. Ice should NEVER be placed directly on the skin. Wearing compression stockings during the first week after surgery can help reduce swelling in your knee, calf and foot, but is not required.                            DRESSING/SHOWERING:     (AQUACEL – brown gel dressing)     - You may shower, your dressing is water-resistant. Do not soak in bathtubs. Remove dressing after postop day 7, then leave incision open to air. You may do this yourself (simply peel it off), or you may ask for assistance from your visiting nurse. Keep your incision clean and dry. Do not pick at your incision. Do not apply creams, ointments or oils to your incision until cleared by your surgeon. Do not soak your incision in sitting water (ie tubs, pools, lakes, etc.) until cleared by your surgeon. Do not scrub the incision – instead, allow soap and water to flow over the incision and then pat it dry with a clean towel.     (PREVENA or YINA – incisional wound vac)     - You have an incisional wound vac dressing with tubing to attached canister/battery pack. You may shower but must keep battery pack dry at all times. The battery dies in 7 days then can remove dressing and leave open to air. Keep your incision clean and dry. Do not pick at your incision. Do not apply creams, ointments or oils to your incision until cleared by your surgeon. Do not soak your incision in sitting water (ie tubs, pools, lakes, etc.) until cleared by your surgeon. Do not scrub the incision – instead, allow soap and water to flow over the incision and then pat it dry with a clean towel.          MEDICATION/ANTICOAGULATION:     -You have been prescribed aspirin, as a preventative to help prevent postoperative blood clots. Please take this medication as prescribed.      - You have been prescribed medications for pain:       - Tylenol for mild to moderate pain. Do not exceed 3,000mg daily.       - For more severe pain, take Tylenol with the addition of narcotic pain medication. Take this medication as prescribed. This medication may cause drowsiness or dizziness. Do not operate machinery. This medication may cause constipation.     - For any additional medications, follow instructions on the bottle.      -Try to have regular bowel movements. Take stool softener or laxative if necessary. You may wish to take Miralax daily until you have regular bowel movements.      - If you have been prescribed Aspirin or an anti-inflammatory, please take prilosec (omeprazole) once a day, before breakfast, until no longer taking Aspirin or anti-inflammatory. This will help protect your stomach.     - If you have a pain management physician, please follow-up with them postoperatively.      - If you experience any negative side effects of your medications, please call your surgeon's office to discuss.           FOLLOW-UP:     - Call to schedule an appt with Dr. Cole for follow up.     - Please follow-up with your primary care physician or any other specialist you see postoperatively, if needed.      - Contact your doctor or go to the emergency room if you experience: fever greater than 101.5, chills, chest pain, difficulty breathing, redness or excessive drainage around the incision, other concerns.       ACTIVITY:     - 50% partial weight-bearing on right leg. No strenuous activity, heavy lifting, driving or returning to work until cleared by MD.     - Apply a cold compress to the surgical site several times daily to reduce pain and swelling. For icing, twenty-minute sessions followed by an hour off is recommended. You should ice as frequently as possible. Ice should NEVER be placed directly on the skin. Wearing compression stockings during the first week after surgery can help reduce swelling in your knee, calf and foot, but is not required.                DRESSING/SHOWERING:   (PREVENA or YINA – incisional wound vac)     - You have an incisional wound vac dressing with tubing to attached canister/battery pack. You may shower but must keep battery pack dry at all times. The battery dies in 7 days then can remove dressing and leave open to air. Keep your incision clean and dry. Do not pick at your incision. Do not apply creams, ointments or oils to your incision until cleared by your surgeon. Do not soak your incision in sitting water (ie tubs, pools, lakes, etc.) until cleared by your surgeon. Do not scrub the incision – instead, allow soap and water to flow over the incision and then pat it dry with a clean towel.          MEDICATION/ANTICOAGULATION:   -You have been prescribed aspirin, as a preventative to help prevent postoperative blood clots. Please take this medication as prescribed.    - You have been prescribed medications for pain:       - Tylenol for mild to moderate pain. Do not exceed 3,000mg daily.       - For more severe pain, take Tylenol with the addition of narcotic pain medication. Take this medication as prescribed. This medication may cause drowsiness or dizziness. Do not operate machinery. This medication may cause constipation.     - For any additional medications, follow instructions on the bottle.      -Try to have regular bowel movements. Take stool softener or laxative if necessary. You may wish to take Miralax daily until you have regular bowel movements.      - If you have been prescribed Aspirin or an anti-inflammatory, please take prilosec (omeprazole) once a day, before breakfast, until no longer taking Aspirin or anti-inflammatory. This will help protect your stomach.     - If you have a pain management physician, please follow-up with them postoperatively.      - If you experience any negative side effects of your medications, please call your surgeon's office to discuss.           FOLLOW-UP:     - Call to schedule an appt with Dr. Cole for follow up.     - Please follow-up with your primary care physician or any other specialist you see postoperatively, if needed.      - Contact your doctor or go to the emergency room if you experience: fever greater than 101.5, chills, chest pain, difficulty breathing, redness or excessive drainage around the incision, other concerns.       ACTIVITY:     - 50% partial weight-bearing on right leg. No strenuous activity, heavy lifting, driving or returning to work until cleared by MD.     - Apply a cold compress to the surgical site several times daily to reduce pain and swelling. For icing, twenty-minute sessions followed by an hour off is recommended. You should ice as frequently as possible. Ice should NEVER be placed directly on the skin. Wearing compression stockings during the first week after surgery can help reduce swelling in your knee, calf and foot, but is not required.                DRESSING/SHOWERING:   (PREVENA or YINA – incisional wound vac)     - You have an incisional wound vac dressing with tubing to attached canister/battery pack. You may shower but must keep battery pack dry at all times. The battery dies in 7 days then can remove dressing and leave open to air. Keep your incision clean and dry. Do not pick at your incision. Do not apply creams, ointments or oils to your incision until cleared by your surgeon. Do not soak your incision in sitting water (ie tubs, pools, lakes, etc.) until cleared by your surgeon. Do not scrub the incision – instead, allow soap and water to flow over the incision and then pat it dry with a clean towel.          MEDICATION/ANTICOAGULATION:   -You have been prescribed aspirin, as a preventative to help prevent postoperative blood clots. Please take this medication as prescribed.    - You have been prescribed medications for pain:       - Tylenol for mild to moderate pain. Do not exceed 3,000mg daily.       - For more severe pain, take Tylenol with the addition of narcotic pain medication. Take this medication as prescribed. This medication may cause drowsiness or dizziness. Do not operate machinery. This medication may cause constipation.     - For any additional medications, follow instructions on the bottle.      -Try to have regular bowel movements. Take stool softener or laxative if necessary. You may wish to take Miralax daily until you have regular bowel movements.      - If you have been prescribed Aspirin or an anti-inflammatory, please take prilosec (omeprazole) once a day, before breakfast, until no longer taking Aspirin or anti-inflammatory. This will help protect your stomach.     - If you have a pain management physician, please follow-up with them postoperatively.      - If you experience any negative side effects of your medications, please call your surgeon's office to discuss.           FOLLOW-UP:     - Call to schedule an appt with Dr. Cole for follow up.     - Please follow-up with your primary care physician or any other specialist you see postoperatively, if needed.      - Contact your doctor or go to the emergency room if you experience: fever greater than 101.5, chills, chest pain, difficulty breathing, redness or excessive drainage around the incision, other concerns.    Your hemoglobin was 8.0 after surgery. This was due to blood loss related to surgery . Follow up with your PCP in 1 week to check your HGBA1c.      ACTIVITY:     - 50% partial weight-bearing on right leg. No strenuous activity, heavy lifting, driving or returning to work until cleared by MD.     - Apply a cold compress to the surgical site several times daily to reduce pain and swelling. For icing, twenty-minute sessions followed by an hour off is recommended. You should ice as frequently as possible. Ice should NEVER be placed directly on the skin. Wearing compression stockings during the first week after surgery can help reduce swelling in your knee, calf and foot, but is not required.                DRESSING/SHOWERING:   (PREVENA or YINA – incisional wound vac)     - You have an incisional wound vac dressing with tubing to attached canister/battery pack. You may shower but must keep battery pack dry at all times. The battery dies in 7 days then can remove dressing and leave open to air. Keep your incision clean and dry. Do not pick at your incision. Do not apply creams, ointments or oils to your incision until cleared by your surgeon. Do not soak your incision in sitting water (ie tubs, pools, lakes, etc.) until cleared by your surgeon. Do not scrub the incision – instead, allow soap and water to flow over the incision and then pat it dry with a clean towel.          MEDICATION/ANTICOAGULATION:   -You have been prescribed aspirin, as a preventative to help prevent postoperative blood clots. Please take this medication as prescribed.    - You have been prescribed medications for pain:       - Tylenol for mild to moderate pain. Do not exceed 3,000mg daily.       - For more severe pain, take Tylenol with the addition of narcotic pain medication. Take this medication as prescribed. This medication may cause drowsiness or dizziness. Do not operate machinery. This medication may cause constipation.     - For any additional medications, follow instructions on the bottle.      -Try to have regular bowel movements. Take stool softener or laxative if necessary. You may wish to take Miralax daily until you have regular bowel movements.      - If you have been prescribed Aspirin or an anti-inflammatory, please take prilosec (omeprazole) once a day, before breakfast, until no longer taking Aspirin or anti-inflammatory. This will help protect your stomach.     - If you have a pain management physician, please follow-up with them postoperatively.      - If you experience any negative side effects of your medications, please call your surgeon's office to discuss.           FOLLOW-UP:     - Call to schedule an appt with Dr. Cole for follow up.     - Please follow-up with your primary care physician or any other specialist you see postoperatively, if needed.      - Contact your doctor or go to the emergency room if you experience: fever greater than 101.5, chills, chest pain, difficulty breathing, redness or excessive drainage around the incision, other concerns.    Your hemoglobin was 7.1 after surgery. This was due to blood loss related to surgery . Follow up with your PCP in 1 week to check your HGBA1c.

## 2023-05-25 NOTE — DISCHARGE NOTE PROVIDER - HOSPITAL COURSE
Admitted 5/23/23 with right periprosthetic femur fracture  Medicine Consult for pre-op clearance and optimization  Surgery- right distal femur ORIF 5/24/23  Veronica-op Antibiotics- Ancef  Pain control  DVT prophylaxis- aspirin 325mg po bid  OOB/Physical Therapy/ Occupational Therapy    Inpatient events:  post-op day 1- chest pain; EKG WNL, troponin negative, Chest Xray WNL. Likely GERD/ dyspepsia. Protonix prescribed. Mylanta and simethicone ordered PRN.   Admitted 5/23/23 with right periprosthetic femur fracture  Medicine Consult for pre-op clearance and optimization  Surgery- right distal femur ORIF 5/24/23  Veronica-op Antibiotics- Ancef  Pain control  DVT prophylaxis- aspirin 325mg po bid  OOB/Physical Therapy/ Occupational Therapy    Inpatient events:  post-op day 1- chest pain; EKG WNL, troponin negative, Chest Xray WNL. Likely GERD/ dyspepsia. Protonix prescribed. Mylanta and simethicone ordered PRN.  5-30

## 2023-05-25 NOTE — PROGRESS NOTE ADULT - SUBJECTIVE AND OBJECTIVE BOX
Ortho AM Progress Note     Procedure: R IMN w/ cerclage wire   Surgeon: Kelsey    Developed chest pain overnight.   EKG negative. labs wnl.   Denies CP, SOB, N/V, numbness/tingling     Vital Signs Last 24 Hrs  T(C): 36.6 (25 May 2023 01:52), Max: 36.6 (25 May 2023 00:35)  T(F): 97.8 (25 May 2023 01:52), Max: 97.8 (25 May 2023 00:35)  HR: 81 (25 May 2023 01:52) (59 - 92)  BP: 110/66 (25 May 2023 01:52) (101/75 - 165/99)  BP(mean): 86 (24 May 2023 21:57) (74 - 144)  RR: 17 (25 May 2023 01:52) (10 - 19)  SpO2: 95% (25 May 2023 01:52) (87% - 95%)    Parameters below as of 25 May 2023 01:52  Patient On (Oxygen Delivery Method): nasal cannula  O2 Flow (L/min): 3      General: Pt Alert and oriented, NAD  DSG C/D/I x2 alfred  B/L LE: sensation intact, DP 2+, brisk cap refill, EHL/FHL/TA/GS firing bilaterally        A/P: 57yFemale POD#1 s/p R IMN w/ cerclage wire   - Stable  - CXR  - continue monitoring chest pain  - Pain Control  - DVT ppx: asa 325 bid  - Post op abx: ancef  - PT, WBS: rle wbat    Ortho Pager [phone]

## 2023-05-25 NOTE — OCCUPATIONAL THERAPY INITIAL EVALUATION ADULT - LEVEL OF INDEPENDENCE: DRESS LOWER BODY, OT EVAL
don socks semi supine in bed, requiring Max Ax1 2/2 impaired functional reach./maximum assist (25% patients effort)

## 2023-05-25 NOTE — PROVIDER CONTACT NOTE (CRITICAL VALUE NOTIFICATION) - ASSESSMENT
Asking to find out which nasal sprays Dr wants pt to use - he is confused about his prescriptions Patient A&OX4. VSS. Temp 97.9, HR 68, /71, Respiration rate 16, SPO2 94%

## 2023-05-25 NOTE — OCCUPATIONAL THERAPY INITIAL EVALUATION ADULT - MODALITIES TREATMENT COMMENTS
Pt able to ambulate 5 shuffling side steps with Min Ax2 using RW, demo impaired ability to weight shift 2/2 RLE weakness and +RLE PWB 50%, however pt able to maintain PWB precautions throughout.

## 2023-05-25 NOTE — OCCUPATIONAL THERAPY INITIAL EVALUATION ADULT - BATHING, PREVIOUS LEVEL OF FUNCTION, OT EVAL
independent
How Severe Is It?: mild
Is This A New Presentation, Or A Follow-Up?: Rash
Additional History: Patient was diagnosed with POD 2 yrs ago, used desonide that she had left, seems to be clearing

## 2023-05-25 NOTE — DISCHARGE NOTE PROVIDER - NSDCCPTREATMENT_GEN_ALL_CORE_FT
PRINCIPAL PROCEDURE  Procedure: Intramedullary nailing of right femur  Findings and Treatment: right femur IMN

## 2023-05-25 NOTE — PHYSICAL THERAPY INITIAL EVALUATION ADULT - GENERAL OBSERVATIONS, REHAB EVAL
PT IE completed. Patient received semi supine in bed +heplock IV, +R hip + R distal knee dressing + YINA blood tinged, intact, +purewick, +(B) SCDs, +daughter present at bedside, patient NAD, willing to work with PT.

## 2023-05-25 NOTE — OCCUPATIONAL THERAPY INITIAL EVALUATION ADULT - PERTINENT HX OF CURRENT PROBLEM, REHAB EVAL
57y Female admitted to [facility] with R femur fx, now POD#1 s/p right femur IMN on 5/24.

## 2023-05-25 NOTE — PROVIDER CONTACT NOTE (CRITICAL VALUE NOTIFICATION) - SITUATION
Patient admitted 5/23/23 for slipping and falling in shower, patient has right periprosthetic distal femur fracture. 5/24/23 Right femur IM Nailing.

## 2023-05-25 NOTE — PROGRESS NOTE ADULT - SUBJECTIVE AND OBJECTIVE BOX
Ortho Note    Pt seen and examined. Reported chest pain overnight, but states feels like it is gas and is "moving to back".  Has felt a little better since drinking gingerale and burping. Chest xray, EKG and troponins WNL  RLE pain well-controlled.  Denies CP, SOB, N/V, numbness/tingling     Vital Signs Last 24 Hrs  T(C): 36.6 (05-25-23 @ 08:25), Max: 36.8 (05-25-23 @ 05:45)  T(F): 97.9 (05-25-23 @ 08:25), Max: 98.2 (05-25-23 @ 05:45)  HR: 68 (05-25-23 @ 08:25) (68 - 73)  BP: 124/71 (05-25-23 @ 08:25) (124/71 - 147/85)  BP(mean): 89 (05-25-23 @ 08:25) (89 - 89)  RR: 16 (05-25-23 @ 08:25) (16 - 18)  SpO2: 94% (05-25-23 @ 08:25) (93% - 94%)  AVSS    General: Pt Alert and oriented, NAD  DSG- alfred with some serosanguinous drainage, intact and holding suction  Pulses: +2DP, WWP feet  Sensation: SILT BLE  Motor: 5/5 EHL/FHL/TA/GS                          12.9   11.18 )-----------( 247      ( 23 May 2023 15:49 )             40.1     05-25    135  |  101  |  16  ----------------------------<  156<H>  4.5   |  20<L>  |  0.66    Ca    7.6<L>      25 May 2023 05:59  Phos  4.7     05-25  Mg     1.6     05-25        A/P: 57yFemale POD#1 s/p right femur IMN  - VSS, Labs WNL- continue to appreciate medicine recommendations  - chest pain- EKG, Chest Xray WNL, trop neg- likely gas/ acid reflux related, simtheicone and mylanta ordered, continue protonix daily  - Pain Control  - DVT ppx: ASA 325mg bid  - PT, WBS: PWB RLE  - OOB for meals, I/S  - bowel regimen  - dispo: pending PT eval    Ortho Pager [phone]

## 2023-05-25 NOTE — OCCUPATIONAL THERAPY INITIAL EVALUATION ADULT - ADDITIONAL COMMENTS
Prior to admission, pt performing all ADLs/mobility independently, with use of RW for ambulation. Of note, pt owns a rollator.

## 2023-05-25 NOTE — DISCHARGE NOTE PROVIDER - NSDCMRMEDTOKEN_GEN_ALL_CORE_FT
acetaminophen 325 mg oral tablet: 3 tab(s) orally every 8 hours  aspirin 325 mg oral tablet: 1 tab(s) orally every 12 hours  celecoxib 200 mg oral capsule: 1 cap(s) orally every 12 hours  escitalopram 20 mg oral tablet: 1 orally once a day  gabapentin 800 mg oral tablet: 1 orally once a day  oxyCODONE 5 mg oral tablet: 1 tab(s) orally every 6 hours as needed for Moderate Pain (4 - 6) MDD: 4  pantoprazole 40 mg oral delayed release tablet: 1 tab(s) orally once a day  rosuvastatin 5 mg oral tablet: 1 orally once a day  senna leaf extract oral tablet: 2 tab(s) orally once a day (at bedtime)  tiZANidine 2 mg oral tablet: 1 tab(s) orally once a day as needed for  muscle spasm   acetaminophen 325 mg oral tablet: 3 tab(s) orally every 8 hours  aspirin 325 mg oral delayed release tablet: 1 tab(s) orally 2 times a day MDD: 2  escitalopram 20 mg oral tablet: 1 tab(s) orally once a day  gabapentin 800 mg oral tablet: 1 tab(s) orally once a day (at bedtime)  Multiple Vitamins oral tablet: 1 tab(s) orally once a day  oxyCODONE 5 mg oral tablet: 1 tab(s) orally every 6 hours as needed for Moderate Pain (4 - 6) MDD: 6  pantoprazole 40 mg oral delayed release tablet: 1 tab(s) orally once a day (before a meal)  rosuvastatin 5 mg oral tablet: 1 orally once a day  senna leaf extract oral tablet: 2 tab(s) orally once a day (at bedtime)  tiZANidine 2 mg oral tablet: 1 tab(s) orally once a day as needed for  muscle spasm

## 2023-05-26 LAB
ANION GAP SERPL CALC-SCNC: 8 MMOL/L — SIGNIFICANT CHANGE UP (ref 5–17)
BUN SERPL-MCNC: 11 MG/DL — SIGNIFICANT CHANGE UP (ref 7–23)
CALCIUM SERPL-MCNC: 7.9 MG/DL — LOW (ref 8.4–10.5)
CHLORIDE SERPL-SCNC: 101 MMOL/L — SIGNIFICANT CHANGE UP (ref 96–108)
CO2 SERPL-SCNC: 28 MMOL/L — SIGNIFICANT CHANGE UP (ref 22–31)
CREAT SERPL-MCNC: 0.65 MG/DL — SIGNIFICANT CHANGE UP (ref 0.5–1.3)
EGFR: 101 ML/MIN/1.73M2 — SIGNIFICANT CHANGE UP
GLUCOSE SERPL-MCNC: 104 MG/DL — HIGH (ref 70–99)
HCT VFR BLD CALC: 25.2 % — LOW (ref 34.5–45)
HGB BLD-MCNC: 8.2 G/DL — LOW (ref 11.5–15.5)
MCHC RBC-ENTMCNC: 30.8 PG — SIGNIFICANT CHANGE UP (ref 27–34)
MCHC RBC-ENTMCNC: 32.5 GM/DL — SIGNIFICANT CHANGE UP (ref 32–36)
MCV RBC AUTO: 94.7 FL — SIGNIFICANT CHANGE UP (ref 80–100)
NRBC # BLD: 0 /100 WBCS — SIGNIFICANT CHANGE UP (ref 0–0)
PLATELET # BLD AUTO: 196 K/UL — SIGNIFICANT CHANGE UP (ref 150–400)
POTASSIUM SERPL-MCNC: 3.8 MMOL/L — SIGNIFICANT CHANGE UP (ref 3.5–5.3)
POTASSIUM SERPL-SCNC: 3.8 MMOL/L — SIGNIFICANT CHANGE UP (ref 3.5–5.3)
RBC # BLD: 2.66 M/UL — LOW (ref 3.8–5.2)
RBC # FLD: 13.4 % — SIGNIFICANT CHANGE UP (ref 10.3–14.5)
SODIUM SERPL-SCNC: 137 MMOL/L — SIGNIFICANT CHANGE UP (ref 135–145)
WBC # BLD: 8.32 K/UL — SIGNIFICANT CHANGE UP (ref 3.8–10.5)
WBC # FLD AUTO: 8.32 K/UL — SIGNIFICANT CHANGE UP (ref 3.8–10.5)

## 2023-05-26 PROCEDURE — 99232 SBSQ HOSP IP/OBS MODERATE 35: CPT

## 2023-05-26 RX ADMIN — ESCITALOPRAM OXALATE 20 MILLIGRAM(S): 10 TABLET, FILM COATED ORAL at 12:21

## 2023-05-26 RX ADMIN — GABAPENTIN 800 MILLIGRAM(S): 400 CAPSULE ORAL at 21:30

## 2023-05-26 RX ADMIN — OXYCODONE HYDROCHLORIDE 10 MILLIGRAM(S): 5 TABLET ORAL at 05:48

## 2023-05-26 RX ADMIN — OXYCODONE HYDROCHLORIDE 10 MILLIGRAM(S): 5 TABLET ORAL at 22:30

## 2023-05-26 RX ADMIN — Medication 1 TABLET(S): at 12:21

## 2023-05-26 RX ADMIN — OXYCODONE HYDROCHLORIDE 10 MILLIGRAM(S): 5 TABLET ORAL at 04:48

## 2023-05-26 RX ADMIN — POLYETHYLENE GLYCOL 3350 17 GRAM(S): 17 POWDER, FOR SOLUTION ORAL at 21:30

## 2023-05-26 RX ADMIN — PANTOPRAZOLE SODIUM 40 MILLIGRAM(S): 20 TABLET, DELAYED RELEASE ORAL at 05:17

## 2023-05-26 RX ADMIN — Medication 325 MILLIGRAM(S): at 05:17

## 2023-05-26 RX ADMIN — SENNA PLUS 2 TABLET(S): 8.6 TABLET ORAL at 21:30

## 2023-05-26 RX ADMIN — Medication 975 MILLIGRAM(S): at 10:52

## 2023-05-26 RX ADMIN — Medication 975 MILLIGRAM(S): at 17:29

## 2023-05-26 RX ADMIN — OXYCODONE HYDROCHLORIDE 10 MILLIGRAM(S): 5 TABLET ORAL at 21:30

## 2023-05-26 RX ADMIN — Medication 325 MILLIGRAM(S): at 17:30

## 2023-05-26 RX ADMIN — ATORVASTATIN CALCIUM 20 MILLIGRAM(S): 80 TABLET, FILM COATED ORAL at 21:30

## 2023-05-26 NOTE — PROGRESS NOTE ADULT - SUBJECTIVE AND OBJECTIVE BOX
Patient is a 57y old  Female who presents with a chief complaint of Femur Fx (23 May 2023 20:41)        SUBJECTIVE:  Patient was seen and examined at bedside.    Overnight Events : Chest pain/ Epigastric pain on 5/25 9 Am, NO further episodes since       Review of systems: 12 point Review of systems negative unless otherwise documented elsewhere in note.     Diet, Regular (05-24-23 @ 16:00) [Available for Activation]  Diet, Clear Liquid (05-24-23 @ 16:00) [Available for Activation]  Diet, Regular (05-23-23 @ 20:32) [Active]      MEDICATIONS:  MEDICATIONS  (STANDING):  acetaminophen     Tablet .. 975 milliGRAM(s) Oral every 8 hours  aspirin enteric coated 325 milliGRAM(s) Oral two times a day  atorvastatin 20 milliGRAM(s) Oral at bedtime  chlorhexidine 2% Cloths 1 Application(s) Topical <User Schedule>  escitalopram 20 milliGRAM(s) Oral daily  gabapentin 800 milliGRAM(s) Oral at bedtime  lactated ringers. 1000 milliLiter(s) (100 mL/Hr) IV Continuous <Continuous>  multivitamin 1 Tablet(s) Oral daily  pantoprazole    Tablet 40 milliGRAM(s) Oral before breakfast  polyethylene glycol 3350 17 Gram(s) Oral at bedtime  senna 2 Tablet(s) Oral at bedtime    MEDICATIONS  (PRN):  aluminum hydroxide/magnesium hydroxide/simethicone Suspension 30 milliLiter(s) Oral every 4 hours PRN Dyspepsia  HYDROmorphone  Injectable 0.2 milliGRAM(s) IV Push every 6 hours PRN BREAKTHROUGH PAIN  HYDROmorphone  Injectable 0.5 milliGRAM(s) IV Push every 15 minutes PRN breakthrough pain  magnesium hydroxide Suspension 30 milliLiter(s) Oral daily PRN Constipation  ondansetron Injectable 4 milliGRAM(s) IV Push every 6 hours PRN Nausea and/or Vomiting  oxyCODONE    IR 10 milliGRAM(s) Oral every 4 hours PRN Severe Pain (7 - 10)  oxyCODONE    IR 5 milliGRAM(s) Oral every 4 hours PRN Moderate Pain (4 - 6)  simethicone 80 milliGRAM(s) Chew every 8 hours PRN Heartburn      Allergies    No Known Allergies    Intolerances        OBJECTIVE:  Vital Signs Last 24 Hrs  T(C): 37.4 (26 May 2023 08:45), Max: 37.6 (25 May 2023 20:54)  T(F): 99.4 (26 May 2023 08:45), Max: 99.6 (25 May 2023 20:54)  HR: 85 (26 May 2023 08:45) (65 - 85)  BP: 99/68 (26 May 2023 08:45) (99/63 - 115/71)  BP(mean): 79 (26 May 2023 08:45) (79 - 79)  RR: 16 (26 May 2023 08:45) (16 - 18)  SpO2: 93% (26 May 2023 08:45) (93% - 97%)    Parameters below as of 26 May 2023 08:45  Patient On (Oxygen Delivery Method): room air            PHYSICAL EXAM:  Gen: Resting in bed at time of exam, not in distress   HEENT: moist mucosa, no lesions   Neck: supple, trachea at midline  CV: RRR, +S1/S2  Pulm: no wheezing , no crackles  no increase in work of breathing  Abd: soft, NTND  Skin: warm and dry, no new rashes   Ext: moving all 4 extremities spontaneously , no edema  , Right Knee movement limited by Pain   Neuro: AOx3, no gross focal neurological deficits  Psych: affect and behavior appropriate, pleasant at time of interview    LABS:                                   8.2    8.32  )-----------( 196      ( 26 May 2023 06:22 )             25.2     05-26    137  |  101  |  11  ----------------------------<  104<H>  3.8   |  28  |  0.65    Ca    7.9<L>      26 May 2023 06:22  Phos  4.7     05-25  Mg     1.6     05-25              CAPILLARY BLOOD GLUCOSE            MICRODATA:      RADIOLOGY/OTHER STUDIES:

## 2023-05-26 NOTE — PROGRESS NOTE ADULT - SUBJECTIVE AND OBJECTIVE BOX
Ortho Note    Subjective:  Pt seen and examined today. Patient reports surgical site pain, pain managed with current pain medication regimen.   Denies CP, SOB, N/V, numbness/tingling   Reviewed plan of care with patient at bedside  Patient expresses she would like to be discharge home if possible instead of [facility]>     Vital Signs Last 24 Hrs  T(C): 37.4 (05-26-23 @ 08:45), Max: 37.4 (05-26-23 @ 05:09)  T(F): 99.4 (05-26-23 @ 08:45), Max: 99.4 (05-26-23 @ 08:45)  HR: 85 (05-26-23 @ 08:45) (82 - 85)  BP: 99/68 (05-26-23 @ 08:45) (99/63 - 99/68)  BP(mean): 79 (05-26-23 @ 08:45) (79 - 79)  RR: 16 (05-26-23 @ 08:45) (16 - 18)  SpO2: 93% (05-26-23 @ 08:45) (93% - 93%)  AVSS    Objective:    Physical Exam:  General: Pt Alert and oriented, NAD  Right hip DSG bilateral alfred dressing wrapped with aquacel C/D/I  Pulses: +2 pedal pulses, wwp toes  Sensation: silt intact bilateral lower extremities  Motor: EHL/FHL/TA/GS- 5/5 bilateral lower extremities        Plan of Care:  A/P: 57yFemale POD#2 s/p right periprosthetic distal femur s/p Right femur IMN   - afebrile  - Pain Control- gabapentin 800mg PO at bedtime, tylenol 975mg PO Q8h, Dilaudid 0.5mg Q4h prn breakthrough pain, Oxycodone 5-10mg PO Q4h prn moderate to severe pain   - DVT ppx: aspirin 325mg PO BID  - PT, WBS: PWBAT  - appreciate medicine recs  - bowel regimen, IS use, PPI   - Dispo- dual plan [facility] versus home    Ortho Pager [phone]

## 2023-05-26 NOTE — PROGRESS NOTE ADULT - SUBJECTIVE AND OBJECTIVE BOX
SUBJECTIVE: Patient seen and examined. Pt doing well o/n.  No f/c/n/v/cp/sob.     OBJECTIVE:  Vital Signs Last 24 Hrs  T(C): 36.3 (26 May 2023 16:22), Max: 37.6 (25 May 2023 20:54)  T(F): 97.4 (26 May 2023 16:22), Max: 99.6 (25 May 2023 20:54)  HR: 83 (26 May 2023 16:22) (80 - 85)  BP: 101/60 (26 May 2023 16:22) (99/63 - 109/63)  BP(mean): 79 (26 May 2023 08:45) (79 - 79)  RR: 17 (26 May 2023 16:22) (16 - 18)  SpO2: 97% (26 May 2023 16:22) (93% - 97%)    Parameters below as of 26 May 2023 16:22  Patient On (Oxygen Delivery Method): room air        Affected extremity:          Dressing: clean/dry/intact            Sensation: SILT         Motor exam: 5/5 TA/GS/EHL         warm well perfused; capillary refill <3 seconds     LABS:                        8.2    8.32  )-----------( 196      ( 26 May 2023 06:22 )             25.2     05-26    137  |  101  |  11  ----------------------------<  104<H>  3.8   |  28  |  0.65    Ca    7.9<L>      26 May 2023 06:22  Phos  4.7     05-25  Mg     1.6     05-25          MEDICATIONS:acetaminophen     Tablet .. 975 milliGRAM(s) Oral every 8 hours  aluminum hydroxide/magnesium hydroxide/simethicone Suspension 30 milliLiter(s) Oral every 4 hours PRN  aspirin enteric coated 325 milliGRAM(s) Oral two times a day  atorvastatin 20 milliGRAM(s) Oral at bedtime  chlorhexidine 2% Cloths 1 Application(s) Topical <User Schedule>  escitalopram 20 milliGRAM(s) Oral daily  gabapentin 800 milliGRAM(s) Oral at bedtime  HYDROmorphone  Injectable 0.5 milliGRAM(s) IV Push every 15 minutes PRN  lactated ringers. 1000 milliLiter(s) IV Continuous <Continuous>  magnesium hydroxide Suspension 30 milliLiter(s) Oral daily PRN  melatonin 5 milliGRAM(s) Oral at bedtime PRN  multivitamin 1 Tablet(s) Oral daily  ondansetron Injectable 4 milliGRAM(s) IV Push every 6 hours PRN  oxyCODONE    IR 5 milliGRAM(s) Oral every 4 hours PRN  oxyCODONE    IR 10 milliGRAM(s) Oral every 4 hours PRN  pantoprazole    Tablet 40 milliGRAM(s) Oral before breakfast  polyethylene glycol 3350 17 Gram(s) Oral at bedtime  senna 2 Tablet(s) Oral at bedtime  simethicone 80 milliGRAM(s) Chew every 8 hours PRN    Anticoagulation:  aspirin enteric coated 325 milliGRAM(s) Oral two times a day    Antibiotics:     Pain medications:   acetaminophen     Tablet .. 975 milliGRAM(s) Oral every 8 hours  escitalopram 20 milliGRAM(s) Oral daily  gabapentin 800 milliGRAM(s) Oral at bedtime  HYDROmorphone  Injectable 0.5 milliGRAM(s) IV Push every 15 minutes PRN  melatonin 5 milliGRAM(s) Oral at bedtime PRN  ondansetron Injectable 4 milliGRAM(s) IV Push every 6 hours PRN  oxyCODONE    IR 5 milliGRAM(s) Oral every 4 hours PRN  oxyCODONE    IR 10 milliGRAM(s) Oral every 4 hours PRN    A/P :  Pt is a 56yo Female s/p  POD # 2  -    Pain control  -    DVT ppx: ASA     -    Weight bearing status: PWB   -    Physical Therapy  -    Dispo: Home/rehab pending recovery

## 2023-05-27 LAB
ANION GAP SERPL CALC-SCNC: 8 MMOL/L — SIGNIFICANT CHANGE UP (ref 5–17)
BUN SERPL-MCNC: 12 MG/DL — SIGNIFICANT CHANGE UP (ref 7–23)
CALCIUM SERPL-MCNC: 7.9 MG/DL — LOW (ref 8.4–10.5)
CHLORIDE SERPL-SCNC: 100 MMOL/L — SIGNIFICANT CHANGE UP (ref 96–108)
CO2 SERPL-SCNC: 30 MMOL/L — SIGNIFICANT CHANGE UP (ref 22–31)
CREAT SERPL-MCNC: 0.63 MG/DL — SIGNIFICANT CHANGE UP (ref 0.5–1.3)
EGFR: 102 ML/MIN/1.73M2 — SIGNIFICANT CHANGE UP
GLUCOSE SERPL-MCNC: 98 MG/DL — SIGNIFICANT CHANGE UP (ref 70–99)
HCT VFR BLD CALC: 25.3 % — LOW (ref 34.5–45)
HGB BLD-MCNC: 8.1 G/DL — LOW (ref 11.5–15.5)
MCHC RBC-ENTMCNC: 30.5 PG — SIGNIFICANT CHANGE UP (ref 27–34)
MCHC RBC-ENTMCNC: 32 GM/DL — SIGNIFICANT CHANGE UP (ref 32–36)
MCV RBC AUTO: 95.1 FL — SIGNIFICANT CHANGE UP (ref 80–100)
NRBC # BLD: 0 /100 WBCS — SIGNIFICANT CHANGE UP (ref 0–0)
PLATELET # BLD AUTO: 212 K/UL — SIGNIFICANT CHANGE UP (ref 150–400)
POTASSIUM SERPL-MCNC: 3.4 MMOL/L — LOW (ref 3.5–5.3)
POTASSIUM SERPL-SCNC: 3.4 MMOL/L — LOW (ref 3.5–5.3)
RBC # BLD: 2.66 M/UL — LOW (ref 3.8–5.2)
RBC # FLD: 13.2 % — SIGNIFICANT CHANGE UP (ref 10.3–14.5)
SODIUM SERPL-SCNC: 138 MMOL/L — SIGNIFICANT CHANGE UP (ref 135–145)
WBC # BLD: 8.22 K/UL — SIGNIFICANT CHANGE UP (ref 3.8–10.5)
WBC # FLD AUTO: 8.22 K/UL — SIGNIFICANT CHANGE UP (ref 3.8–10.5)

## 2023-05-27 PROCEDURE — 99232 SBSQ HOSP IP/OBS MODERATE 35: CPT

## 2023-05-27 RX ORDER — POTASSIUM CHLORIDE 20 MEQ
20 PACKET (EA) ORAL ONCE
Refills: 0 | Status: COMPLETED | OUTPATIENT
Start: 2023-05-27 | End: 2023-05-27

## 2023-05-27 RX ADMIN — ATORVASTATIN CALCIUM 20 MILLIGRAM(S): 80 TABLET, FILM COATED ORAL at 22:08

## 2023-05-27 RX ADMIN — SENNA PLUS 2 TABLET(S): 8.6 TABLET ORAL at 22:08

## 2023-05-27 RX ADMIN — OXYCODONE HYDROCHLORIDE 10 MILLIGRAM(S): 5 TABLET ORAL at 19:11

## 2023-05-27 RX ADMIN — GABAPENTIN 800 MILLIGRAM(S): 400 CAPSULE ORAL at 22:08

## 2023-05-27 RX ADMIN — OXYCODONE HYDROCHLORIDE 10 MILLIGRAM(S): 5 TABLET ORAL at 03:31

## 2023-05-27 RX ADMIN — OXYCODONE HYDROCHLORIDE 10 MILLIGRAM(S): 5 TABLET ORAL at 22:13

## 2023-05-27 RX ADMIN — Medication 1 TABLET(S): at 11:35

## 2023-05-27 RX ADMIN — ESCITALOPRAM OXALATE 20 MILLIGRAM(S): 10 TABLET, FILM COATED ORAL at 11:35

## 2023-05-27 RX ADMIN — Medication 20 MILLIEQUIVALENT(S): at 11:34

## 2023-05-27 RX ADMIN — Medication 325 MILLIGRAM(S): at 05:19

## 2023-05-27 RX ADMIN — OXYCODONE HYDROCHLORIDE 10 MILLIGRAM(S): 5 TABLET ORAL at 10:51

## 2023-05-27 RX ADMIN — OXYCODONE HYDROCHLORIDE 10 MILLIGRAM(S): 5 TABLET ORAL at 23:13

## 2023-05-27 RX ADMIN — OXYCODONE HYDROCHLORIDE 10 MILLIGRAM(S): 5 TABLET ORAL at 18:11

## 2023-05-27 RX ADMIN — Medication 975 MILLIGRAM(S): at 10:51

## 2023-05-27 RX ADMIN — OXYCODONE HYDROCHLORIDE 10 MILLIGRAM(S): 5 TABLET ORAL at 04:31

## 2023-05-27 RX ADMIN — Medication 975 MILLIGRAM(S): at 18:10

## 2023-05-27 RX ADMIN — OXYCODONE HYDROCHLORIDE 10 MILLIGRAM(S): 5 TABLET ORAL at 11:51

## 2023-05-27 RX ADMIN — PANTOPRAZOLE SODIUM 40 MILLIGRAM(S): 20 TABLET, DELAYED RELEASE ORAL at 05:19

## 2023-05-27 RX ADMIN — Medication 325 MILLIGRAM(S): at 18:10

## 2023-05-27 NOTE — PROGRESS NOTE ADULT - SUBJECTIVE AND OBJECTIVE BOX
Patient is a 57y old  Female who presents with a chief complaint of Femur Fx (26 May 2023 11:02)       SUBJECTIVE / OVERNIGHT EVENTS: Patient seen and examined at bedside. No acute events overnight. Pain is controlled.  Patient understands plan is for [facility].    MEDICATIONS  (STANDING):  acetaminophen     Tablet .. 975 milliGRAM(s) Oral every 8 hours  aspirin enteric coated 325 milliGRAM(s) Oral two times a day  atorvastatin 20 milliGRAM(s) Oral at bedtime  escitalopram 20 milliGRAM(s) Oral daily  gabapentin 800 milliGRAM(s) Oral at bedtime  lactated ringers. 1000 milliLiter(s) (100 mL/Hr) IV Continuous <Continuous>  multivitamin 1 Tablet(s) Oral daily  pantoprazole    Tablet 40 milliGRAM(s) Oral before breakfast  polyethylene glycol 3350 17 Gram(s) Oral at bedtime  senna 2 Tablet(s) Oral at bedtime    MEDICATIONS  (PRN):  aluminum hydroxide/magnesium hydroxide/simethicone Suspension 30 milliLiter(s) Oral every 4 hours PRN Dyspepsia  HYDROmorphone  Injectable 0.5 milliGRAM(s) IV Push every 15 minutes PRN breakthrough pain  magnesium hydroxide Suspension 30 milliLiter(s) Oral daily PRN Constipation  melatonin 5 milliGRAM(s) Oral at bedtime PRN Sleep  ondansetron Injectable 4 milliGRAM(s) IV Push every 6 hours PRN Nausea and/or Vomiting  oxyCODONE    IR 5 milliGRAM(s) Oral every 4 hours PRN Moderate Pain (4 - 6)  oxyCODONE    IR 10 milliGRAM(s) Oral every 4 hours PRN Severe Pain (7 - 10)  simethicone 80 milliGRAM(s) Chew every 8 hours PRN Heartburn      CAPILLARY BLOOD GLUCOSE        I&O's Summary    26 May 2023 07:01  -  27 May 2023 07:00  --------------------------------------------------------  IN: 320 mL / OUT: 1000 mL / NET: -680 mL    27 May 2023 07:01  -  27 May 2023 13:27  --------------------------------------------------------  IN: 240 mL / OUT: 0 mL / NET: 240 mL        PHYSICAL EXAM:  Vital Signs Last 24 Hrs  T(C): 36.8 (27 May 2023 08:26), Max: 37.3 (26 May 2023 20:45)  T(F): 98.2 (27 May 2023 08:26), Max: 99.2 (26 May 2023 20:45)  HR: 69 (27 May 2023 08:26) (69 - 84)  BP: 115/70 (27 May 2023 08:26) (95/60 - 117/68)  BP(mean): --  RR: 18 (27 May 2023 08:26) (16 - 18)  SpO2: 97% (27 May 2023 08:26) (93% - 97%)    Parameters below as of 27 May 2023 08:26  Patient On (Oxygen Delivery Method): room air        GEN: female in NAD, appears comfortable, no diaphoresis  EYES: No scleral injection, PERRL, EOMI  ENTM: neck supple & symmetric without tracheal deviation, moist membranes, no gross hearing impairment, thyroid gland not enlarged  CV: +S1/S2, no m/r/g, no abdominal bruit, no LE edema  RESP: breathing comfortably, no respiratory accessory muscle use, CTAB, no w/r/r  GI: normoactive BS, soft, NTND, no rebounding/guarding, no palpable masses    LABS:                        8.1    8.22  )-----------( 212      ( 27 May 2023 07:07 )             25.3     05-27    138  |  100  |  12  ----------------------------<  98  3.4<L>   |  30  |  0.63    Ca    7.9<L>      27 May 2023 07:07                  RADIOLOGY & ADDITIONAL TESTS:  Results Reviewed:   Imaging Personally Reviewed:  Electrocardiogram Personally Reviewed:    COORDINATION OF CARE:  Care Discussed with Consultants/Other Providers [Y/N]:  Prior or Outpatient Records Reviewed [Y/N]:

## 2023-05-27 NOTE — PROGRESS NOTE ADULT - SUBJECTIVE AND OBJECTIVE BOX
SUBJECTIVE: Patient seen and examined. Pt doing well o/n.  No f/c/n/v/cp/sob.     OBJECTIVE:  Vital Signs Last 24 Hrs  T(C): 36.8 (27 May 2023 08:26), Max: 37.3 (26 May 2023 20:45)  T(F): 98.2 (27 May 2023 08:26), Max: 99.2 (26 May 2023 20:45)  HR: 69 (27 May 2023 08:26) (69 - 84)  BP: 115/70 (27 May 2023 08:26) (95/60 - 117/68)  BP(mean): --  RR: 18 (27 May 2023 08:26) (16 - 18)  SpO2: 97% (27 May 2023 08:26) (93% - 97%)    Parameters below as of 27 May 2023 08:26  Patient On (Oxygen Delivery Method): room air        Affected extremity:          Dressing: clean/dry/intact            Sensation: SILT         Motor exam: 5/5 TA/GS/EHL         warm well perfused; capillary refill <3 seconds     LABS:                        8.1    8.22  )-----------( 212      ( 27 May 2023 07:07 )             25.3     05-27    138  |  100  |  12  ----------------------------<  98  3.4<L>   |  30  |  0.63    Ca    7.9<L>      27 May 2023 07:07          MEDICATIONS:acetaminophen     Tablet .. 975 milliGRAM(s) Oral every 8 hours  aluminum hydroxide/magnesium hydroxide/simethicone Suspension 30 milliLiter(s) Oral every 4 hours PRN  aspirin enteric coated 325 milliGRAM(s) Oral two times a day  atorvastatin 20 milliGRAM(s) Oral at bedtime  escitalopram 20 milliGRAM(s) Oral daily  gabapentin 800 milliGRAM(s) Oral at bedtime  HYDROmorphone  Injectable 0.5 milliGRAM(s) IV Push every 15 minutes PRN  lactated ringers. 1000 milliLiter(s) IV Continuous <Continuous>  magnesium hydroxide Suspension 30 milliLiter(s) Oral daily PRN  melatonin 5 milliGRAM(s) Oral at bedtime PRN  multivitamin 1 Tablet(s) Oral daily  ondansetron Injectable 4 milliGRAM(s) IV Push every 6 hours PRN  oxyCODONE    IR 5 milliGRAM(s) Oral every 4 hours PRN  oxyCODONE    IR 10 milliGRAM(s) Oral every 4 hours PRN  pantoprazole    Tablet 40 milliGRAM(s) Oral before breakfast  polyethylene glycol 3350 17 Gram(s) Oral at bedtime  senna 2 Tablet(s) Oral at bedtime  simethicone 80 milliGRAM(s) Chew every 8 hours PRN    Anticoagulation:  aspirin enteric coated 325 milliGRAM(s) Oral two times a day    Antibiotics:     Pain medications:   acetaminophen     Tablet .. 975 milliGRAM(s) Oral every 8 hours  escitalopram 20 milliGRAM(s) Oral daily  gabapentin 800 milliGRAM(s) Oral at bedtime  HYDROmorphone  Injectable 0.5 milliGRAM(s) IV Push every 15 minutes PRN  melatonin 5 milliGRAM(s) Oral at bedtime PRN  ondansetron Injectable 4 milliGRAM(s) IV Push every 6 hours PRN  oxyCODONE    IR 5 milliGRAM(s) Oral every 4 hours PRN  oxyCODONE    IR 10 milliGRAM(s) Oral every 4 hours PRN    A/P :  Pt is a 56yo Female s/p  POD # 3 Right Femur IM nailing  -    Pain control  -    DVT ppx: ASA     -    Weight bearing status: PWB  -    Physical Therapy  -    Dispo: Home or rehab depending on recovery

## 2023-05-27 NOTE — PROGRESS NOTE ADULT - SUBJECTIVE AND OBJECTIVE BOX
Ortho Note    Pt comfortable without complaints, pain controlled  Denies CP, SOB, N/V, new numbness/tingling     Vital Signs Last 24 Hrs  T(C): 37.3 (26 May 2023 20:45), Max: 37.4 (26 May 2023 08:45)  T(F): 99.2 (26 May 2023 20:45), Max: 99.4 (26 May 2023 08:45)  HR: 80 (26 May 2023 21:00) (80 - 85)  BP: 117/68 (26 May 2023 21:00) (95/60 - 117/68)  BP(mean): 79 (26 May 2023 08:45) (79 - 79)  ABP: --  ABP(mean): --  RR: 17 (26 May 2023 20:45) (16 - 17)  SpO2: 93% (26 May 2023 20:45) (93% - 97%)    O2 Parameters below as of 26 May 2023 20:45  Patient On (Oxygen Delivery Method): room air            25 May 2023 07:01  -  26 May 2023 07:00  --------------------------------------------------------  IN: 0 mL / OUT: 1000 mL / NET: -1000 mL    26 May 2023 07:01  -  27 May 2023 05:27  --------------------------------------------------------  IN: 320 mL / OUT: 1000 mL / NET: -680 mL        General: Pt Alert and oriented, NAD  DSG alfred x2, most proximal alfred has visible sanguinous bed   B/L LE: sensation intact, DP 2+, brisk cap refill, EHL/FHL/TA/GS 5/5                            8.2    8.32  )-----------( 196      ( 26 May 2023 06:22 )             25.2     05-26    137  |  101  |  11  ----------------------------<  104<H>  3.8   |  28  |  0.65    Ca    7.9<L>      26 May 2023 06:22  Phos  4.7     05-25  Mg     1.6     05-25        Plan of Care:  A/P: 57yFemale POD#3 s/p right periprosthetic distal femur s/p Right femur IMN   - afebrile  - Pain Control- gabapentin 800mg PO at bedtime, tylenol 975mg PO Q8h, Dilaudid 0.5mg Q4h prn breakthrough pain, Oxycodone 5-10mg PO Q4h prn moderate to severe pain   - DVT ppx: aspirin 325mg PO BID  - PT, WBS: PWBAT  - appreciate medicine recs  - bowel regimen, IS use, PPI   - Dispo- dual plan [facility] versus home  Ortho Pager [phone]

## 2023-05-28 LAB
ANION GAP SERPL CALC-SCNC: 10 MMOL/L — SIGNIFICANT CHANGE UP (ref 5–17)
BLD GP AB SCN SERPL QL: NEGATIVE — SIGNIFICANT CHANGE UP
BUN SERPL-MCNC: 11 MG/DL — SIGNIFICANT CHANGE UP (ref 7–23)
CALCIUM SERPL-MCNC: 8 MG/DL — LOW (ref 8.4–10.5)
CHLORIDE SERPL-SCNC: 101 MMOL/L — SIGNIFICANT CHANGE UP (ref 96–108)
CO2 SERPL-SCNC: 28 MMOL/L — SIGNIFICANT CHANGE UP (ref 22–31)
CREAT SERPL-MCNC: 0.62 MG/DL — SIGNIFICANT CHANGE UP (ref 0.5–1.3)
EGFR: 103 ML/MIN/1.73M2 — SIGNIFICANT CHANGE UP
GLUCOSE SERPL-MCNC: 124 MG/DL — HIGH (ref 70–99)
HCT VFR BLD CALC: 24.2 % — LOW (ref 34.5–45)
HGB BLD-MCNC: 7.6 G/DL — LOW (ref 11.5–15.5)
MCHC RBC-ENTMCNC: 29.5 PG — SIGNIFICANT CHANGE UP (ref 27–34)
MCHC RBC-ENTMCNC: 31.4 GM/DL — LOW (ref 32–36)
MCV RBC AUTO: 93.8 FL — SIGNIFICANT CHANGE UP (ref 80–100)
NRBC # BLD: 0 /100 WBCS — SIGNIFICANT CHANGE UP (ref 0–0)
PLATELET # BLD AUTO: 221 K/UL — SIGNIFICANT CHANGE UP (ref 150–400)
POTASSIUM SERPL-MCNC: 3.4 MMOL/L — LOW (ref 3.5–5.3)
POTASSIUM SERPL-SCNC: 3.4 MMOL/L — LOW (ref 3.5–5.3)
RBC # BLD: 2.58 M/UL — LOW (ref 3.8–5.2)
RBC # FLD: 13.2 % — SIGNIFICANT CHANGE UP (ref 10.3–14.5)
RH IG SCN BLD-IMP: POSITIVE — SIGNIFICANT CHANGE UP
SODIUM SERPL-SCNC: 139 MMOL/L — SIGNIFICANT CHANGE UP (ref 135–145)
WBC # BLD: 6.36 K/UL — SIGNIFICANT CHANGE UP (ref 3.8–10.5)
WBC # FLD AUTO: 6.36 K/UL — SIGNIFICANT CHANGE UP (ref 3.8–10.5)

## 2023-05-28 PROCEDURE — 99232 SBSQ HOSP IP/OBS MODERATE 35: CPT

## 2023-05-28 RX ADMIN — PANTOPRAZOLE SODIUM 40 MILLIGRAM(S): 20 TABLET, DELAYED RELEASE ORAL at 05:48

## 2023-05-28 RX ADMIN — Medication 975 MILLIGRAM(S): at 11:03

## 2023-05-28 RX ADMIN — GABAPENTIN 800 MILLIGRAM(S): 400 CAPSULE ORAL at 22:17

## 2023-05-28 RX ADMIN — Medication 325 MILLIGRAM(S): at 17:56

## 2023-05-28 RX ADMIN — Medication 1 TABLET(S): at 11:03

## 2023-05-28 RX ADMIN — Medication 975 MILLIGRAM(S): at 17:56

## 2023-05-28 RX ADMIN — ESCITALOPRAM OXALATE 20 MILLIGRAM(S): 10 TABLET, FILM COATED ORAL at 11:04

## 2023-05-28 RX ADMIN — OXYCODONE HYDROCHLORIDE 10 MILLIGRAM(S): 5 TABLET ORAL at 09:48

## 2023-05-28 RX ADMIN — Medication 325 MILLIGRAM(S): at 05:48

## 2023-05-28 RX ADMIN — OXYCODONE HYDROCHLORIDE 10 MILLIGRAM(S): 5 TABLET ORAL at 19:43

## 2023-05-28 RX ADMIN — SENNA PLUS 2 TABLET(S): 8.6 TABLET ORAL at 22:18

## 2023-05-28 RX ADMIN — OXYCODONE HYDROCHLORIDE 10 MILLIGRAM(S): 5 TABLET ORAL at 20:43

## 2023-05-28 RX ADMIN — MAGNESIUM HYDROXIDE 30 MILLILITER(S): 400 TABLET, CHEWABLE ORAL at 05:49

## 2023-05-28 RX ADMIN — Medication 975 MILLIGRAM(S): at 01:00

## 2023-05-28 RX ADMIN — ATORVASTATIN CALCIUM 20 MILLIGRAM(S): 80 TABLET, FILM COATED ORAL at 22:17

## 2023-05-28 RX ADMIN — OXYCODONE HYDROCHLORIDE 10 MILLIGRAM(S): 5 TABLET ORAL at 08:48

## 2023-05-28 RX ADMIN — Medication 975 MILLIGRAM(S): at 02:00

## 2023-05-28 NOTE — PROGRESS NOTE ADULT - SUBJECTIVE AND OBJECTIVE BOX
Patient is a 57y old  Female who presents with a chief complaint of Fall & Right Femur Fx (27 May 2023 08:12)      SUBJECTIVE / OVERNIGHT EVENTS: Patient seen and examined at bedside. No acute events overnight. Daughter at bedside. No pain in distal femur.    MEDICATIONS  (STANDING):  acetaminophen     Tablet .. 975 milliGRAM(s) Oral every 8 hours  aspirin enteric coated 325 milliGRAM(s) Oral two times a day  atorvastatin 20 milliGRAM(s) Oral at bedtime  escitalopram 20 milliGRAM(s) Oral daily  gabapentin 800 milliGRAM(s) Oral at bedtime  lactated ringers. 1000 milliLiter(s) (100 mL/Hr) IV Continuous <Continuous>  multivitamin 1 Tablet(s) Oral daily  pantoprazole    Tablet 40 milliGRAM(s) Oral before breakfast  polyethylene glycol 3350 17 Gram(s) Oral at bedtime  senna 2 Tablet(s) Oral at bedtime    MEDICATIONS  (PRN):  aluminum hydroxide/magnesium hydroxide/simethicone Suspension 30 milliLiter(s) Oral every 4 hours PRN Dyspepsia  HYDROmorphone  Injectable 0.5 milliGRAM(s) IV Push every 15 minutes PRN breakthrough pain  magnesium hydroxide Suspension 30 milliLiter(s) Oral daily PRN Constipation  melatonin 5 milliGRAM(s) Oral at bedtime PRN Sleep  ondansetron Injectable 4 milliGRAM(s) IV Push every 6 hours PRN Nausea and/or Vomiting  oxyCODONE    IR 5 milliGRAM(s) Oral every 4 hours PRN Moderate Pain (4 - 6)  oxyCODONE    IR 10 milliGRAM(s) Oral every 4 hours PRN Severe Pain (7 - 10)  simethicone 80 milliGRAM(s) Chew every 8 hours PRN Heartburn      CAPILLARY BLOOD GLUCOSE        I&O's Summary    27 May 2023 07:01  -  28 May 2023 07:00  --------------------------------------------------------  IN: 480 mL / OUT: 1000 mL / NET: -520 mL    28 May 2023 07:01  -  28 May 2023 13:26  --------------------------------------------------------  IN: 720 mL / OUT: 400 mL / NET: 320 mL        PHYSICAL EXAM:  Vital Signs Last 24 Hrs  T(C): 36.7 (28 May 2023 08:02), Max: 36.9 (27 May 2023 16:36)  T(F): 98.1 (28 May 2023 08:02), Max: 98.5 (27 May 2023 16:36)  HR: 66 (28 May 2023 08:02) (61 - 75)  BP: 110/71 (28 May 2023 08:02) (110/71 - 125/71)  BP(mean): --  RR: 18 (28 May 2023 08:02) (16 - 18)  SpO2: 95% (28 May 2023 08:02) (95% - 98%)    Parameters below as of 28 May 2023 08:02  Patient On (Oxygen Delivery Method): room air        GEN: female in NAD, appears comfortable, no diaphoresis  EYES: No scleral injection, PERRL, EOMI  ENTM: neck supple & symmetric without tracheal deviation, moist membranes, no gross hearing impairment, thyroid gland not enlarged  CV: +S1/S2, no m/r/g, no abdominal bruit, no LE edema  RESP: breathing comfortably, no respiratory accessory muscle use, CTAB, no w/r/r  GI: normoactive BS, soft, NTND, no rebounding/guarding, no palpable masses    LABS:                        7.6    6.36  )-----------( 221      ( 28 May 2023 07:50 )             24.2     05-28    139  |  101  |  11  ----------------------------<  124<H>  3.4<L>   |  28  |  0.62    Ca    8.0<L>      28 May 2023 07:50                  RADIOLOGY & ADDITIONAL TESTS:  Results Reviewed:   Imaging Personally Reviewed:  Electrocardiogram Personally Reviewed:    COORDINATION OF CARE:  Care Discussed with Consultants/Other Providers [Y/N]:  Prior or Outpatient Records Reviewed [Y/N]:

## 2023-05-28 NOTE — PROGRESS NOTE ADULT - SUBJECTIVE AND OBJECTIVE BOX
Ortho Note    Pt comfortable without complaints, pain controlled, states she's doing well with physical therapy. Denies CP, SOB, N/V, new numbness/tingling     Vital Signs Last 24 Hrs  T(C): 36.6 (28 May 2023 06:19), Max: 36.9 (27 May 2023 16:36)  T(F): 97.9 (28 May 2023 06:19), Max: 98.5 (27 May 2023 16:36)  HR: 61 (28 May 2023 06:19) (61 - 75)  BP: 124/77 (28 May 2023 06:19) (115/70 - 125/71)  BP(mean): --  RR: 16 (28 May 2023 06:19) (16 - 18)  SpO2: 98% (28 May 2023 06:19) (96% - 98%)    Parameters below as of 28 May 2023 06:19  Patient On (Oxygen Delivery Method): room air    Physical Exam:  General: Pt Alert and oriented, NAD  DSG alfred x2, ACE wrap in place  B/L LE: sensation intact, DP 2+, brisk cap refill, EHL/FHL/TA/GS 5/5      Labs:                        8.1    8.22  )-----------( 212      ( 27 May 2023 07:07 )             25.3                05-27    138  |  100  |  12  ----------------------------<  98  3.4<L>   |  30  |  0.63    Ca    7.9<L>      27 May 2023 07:07               Plan of Care:  A/P: 57yFemale POD#4 s/p right periprosthetic distal femur s/p Right femur IMN   - afebrile  - F/u AM CBC  - Pain Control- gabapentin 800mg PO at bedtime, tylenol 975mg PO Q8h, Dilaudid 0.5mg Q4h prn breakthrough pain, Oxycodone 5-10mg PO Q4h prn moderate to severe pain   - DVT ppx: aspirin 325mg PO BID  - PT, WBS: PWBAT  - appreciate medicine recs  - bowel regimen, IS use, PPI   - Dispo- dual plan [facility] versus home  Ortho Pager [phone]

## 2023-05-29 LAB
ANION GAP SERPL CALC-SCNC: 8 MMOL/L — SIGNIFICANT CHANGE UP (ref 5–17)
BLD GP AB SCN SERPL QL: NEGATIVE — SIGNIFICANT CHANGE UP
BUN SERPL-MCNC: 10 MG/DL — SIGNIFICANT CHANGE UP (ref 7–23)
CALCIUM SERPL-MCNC: 8 MG/DL — LOW (ref 8.4–10.5)
CHLORIDE SERPL-SCNC: 102 MMOL/L — SIGNIFICANT CHANGE UP (ref 96–108)
CO2 SERPL-SCNC: 30 MMOL/L — SIGNIFICANT CHANGE UP (ref 22–31)
CREAT SERPL-MCNC: 0.7 MG/DL — SIGNIFICANT CHANGE UP (ref 0.5–1.3)
EGFR: 100 ML/MIN/1.73M2 — SIGNIFICANT CHANGE UP
GLUCOSE SERPL-MCNC: 114 MG/DL — HIGH (ref 70–99)
HCT VFR BLD CALC: 24.2 % — LOW (ref 34.5–45)
HGB BLD-MCNC: 7.4 G/DL — LOW (ref 11.5–15.5)
MCHC RBC-ENTMCNC: 29.5 PG — SIGNIFICANT CHANGE UP (ref 27–34)
MCHC RBC-ENTMCNC: 30.6 GM/DL — LOW (ref 32–36)
MCV RBC AUTO: 96.4 FL — SIGNIFICANT CHANGE UP (ref 80–100)
NRBC # BLD: 0 /100 WBCS — SIGNIFICANT CHANGE UP (ref 0–0)
PLATELET # BLD AUTO: 243 K/UL — SIGNIFICANT CHANGE UP (ref 150–400)
POTASSIUM SERPL-MCNC: 3.9 MMOL/L — SIGNIFICANT CHANGE UP (ref 3.5–5.3)
POTASSIUM SERPL-SCNC: 3.9 MMOL/L — SIGNIFICANT CHANGE UP (ref 3.5–5.3)
RBC # BLD: 2.51 M/UL — LOW (ref 3.8–5.2)
RBC # FLD: 13.1 % — SIGNIFICANT CHANGE UP (ref 10.3–14.5)
RH IG SCN BLD-IMP: POSITIVE — SIGNIFICANT CHANGE UP
SODIUM SERPL-SCNC: 140 MMOL/L — SIGNIFICANT CHANGE UP (ref 135–145)
WBC # BLD: 6.72 K/UL — SIGNIFICANT CHANGE UP (ref 3.8–10.5)
WBC # FLD AUTO: 6.72 K/UL — SIGNIFICANT CHANGE UP (ref 3.8–10.5)

## 2023-05-29 PROCEDURE — 99232 SBSQ HOSP IP/OBS MODERATE 35: CPT

## 2023-05-29 RX ADMIN — OXYCODONE HYDROCHLORIDE 10 MILLIGRAM(S): 5 TABLET ORAL at 19:29

## 2023-05-29 RX ADMIN — Medication 975 MILLIGRAM(S): at 12:38

## 2023-05-29 RX ADMIN — PANTOPRAZOLE SODIUM 40 MILLIGRAM(S): 20 TABLET, DELAYED RELEASE ORAL at 06:08

## 2023-05-29 RX ADMIN — OXYCODONE HYDROCHLORIDE 10 MILLIGRAM(S): 5 TABLET ORAL at 08:42

## 2023-05-29 RX ADMIN — Medication 325 MILLIGRAM(S): at 06:09

## 2023-05-29 RX ADMIN — Medication 325 MILLIGRAM(S): at 18:09

## 2023-05-29 RX ADMIN — Medication 1 TABLET(S): at 12:28

## 2023-05-29 RX ADMIN — OXYCODONE HYDROCHLORIDE 10 MILLIGRAM(S): 5 TABLET ORAL at 09:42

## 2023-05-29 RX ADMIN — Medication 975 MILLIGRAM(S): at 13:44

## 2023-05-29 RX ADMIN — GABAPENTIN 800 MILLIGRAM(S): 400 CAPSULE ORAL at 22:15

## 2023-05-29 RX ADMIN — Medication 975 MILLIGRAM(S): at 06:09

## 2023-05-29 RX ADMIN — MAGNESIUM HYDROXIDE 30 MILLILITER(S): 400 TABLET, CHEWABLE ORAL at 09:44

## 2023-05-29 RX ADMIN — ESCITALOPRAM OXALATE 20 MILLIGRAM(S): 10 TABLET, FILM COATED ORAL at 12:27

## 2023-05-29 RX ADMIN — Medication 975 MILLIGRAM(S): at 22:15

## 2023-05-29 RX ADMIN — OXYCODONE HYDROCHLORIDE 10 MILLIGRAM(S): 5 TABLET ORAL at 20:30

## 2023-05-29 RX ADMIN — ATORVASTATIN CALCIUM 20 MILLIGRAM(S): 80 TABLET, FILM COATED ORAL at 22:15

## 2023-05-29 NOTE — PROGRESS NOTE ADULT - SUBJECTIVE AND OBJECTIVE BOX
Ortho Note    Pt comfortable without complaints, pain controlled. Denies CP, SOB, N/V, new numbness/tingling     Vital Signs Last 24 Hrs  T(C): 36.8 (29 May 2023 05:00), Max: 36.8 (29 May 2023 05:00)  T(F): 98.3 (29 May 2023 05:00), Max: 98.3 (29 May 2023 05:00)  HR: 72 (29 May 2023 05:00) (66 - 73)  BP: 106/60 (29 May 2023 05:00) (105/69 - 110/71)  BP(mean): --  RR: 17 (29 May 2023 05:00) (17 - 18)  SpO2: 94% (29 May 2023 05:00) (94% - 96%)    Parameters below as of 29 May 2023 05:00  Patient On (Oxygen Delivery Method): room air      Physical Exam:  General: Pt Alert and oriented, NAD  DSG alfred x2, ACE wrap in place  B/L LE: sensation intact, DP 2+, brisk cap refill, EHL/FHL/TA/GS 5/5      Labs:                            7.6    6.36  )-----------( 221      ( 28 May 2023 07:50 )             24.2           05-28    139  |  101  |  11  ----------------------------<  124<H>  3.4<L>   |  28  |  0.62    Ca    8.0<L>      28 May 2023 07:50        Plan of Care:  A/P: 57yFemale POD#5 s/p right periprosthetic distal femur s/p Right femur IMN   - afebrile  - F/u AM CBC  - Pain Control- gabapentin 800mg PO at bedtime, tylenol 975mg PO Q8h, Dilaudid 0.5mg Q4h prn breakthrough pain, Oxycodone 5-10mg PO Q4h prn moderate to severe pain   - DVT ppx: aspirin 325mg PO BID  - PT, WBS: PWBAT  - appreciate medicine recs  - bowel regimen, IS use, PPI   - Dispo- dual plan vik[facility] versus home  Ortho Pager [phone]

## 2023-05-29 NOTE — PROGRESS NOTE ADULT - SUBJECTIVE AND OBJECTIVE BOX
Patient is a 57y old  Female who presents with a chief complaint of Fall (28 May 2023 13:26)      SUBJECTIVE / OVERNIGHT EVENTS: Patient seen and examined at bedside. No acute events overnight. Denies pain in leg. Currently OOBTC and on laptop.    MEDICATIONS  (STANDING):  acetaminophen     Tablet .. 975 milliGRAM(s) Oral every 8 hours  aspirin enteric coated 325 milliGRAM(s) Oral two times a day  atorvastatin 20 milliGRAM(s) Oral at bedtime  escitalopram 20 milliGRAM(s) Oral daily  gabapentin 800 milliGRAM(s) Oral at bedtime  lactated ringers. 1000 milliLiter(s) (100 mL/Hr) IV Continuous <Continuous>  multivitamin 1 Tablet(s) Oral daily  pantoprazole    Tablet 40 milliGRAM(s) Oral before breakfast  polyethylene glycol 3350 17 Gram(s) Oral at bedtime  senna 2 Tablet(s) Oral at bedtime    MEDICATIONS  (PRN):  aluminum hydroxide/magnesium hydroxide/simethicone Suspension 30 milliLiter(s) Oral every 4 hours PRN Dyspepsia  HYDROmorphone  Injectable 0.5 milliGRAM(s) IV Push every 15 minutes PRN breakthrough pain  magnesium hydroxide Suspension 30 milliLiter(s) Oral daily PRN Constipation  melatonin 5 milliGRAM(s) Oral at bedtime PRN Sleep  ondansetron Injectable 4 milliGRAM(s) IV Push every 6 hours PRN Nausea and/or Vomiting  oxyCODONE    IR 5 milliGRAM(s) Oral every 4 hours PRN Moderate Pain (4 - 6)  oxyCODONE    IR 10 milliGRAM(s) Oral every 4 hours PRN Severe Pain (7 - 10)  simethicone 80 milliGRAM(s) Chew every 8 hours PRN Heartburn      CAPILLARY BLOOD GLUCOSE        I&O's Summary    28 May 2023 07:01  -  29 May 2023 07:00  --------------------------------------------------------  IN: 960 mL / OUT: 400 mL / NET: 560 mL        PHYSICAL EXAM:  Vital Signs Last 24 Hrs  T(C): 36.9 (29 May 2023 09:00), Max: 36.9 (29 May 2023 09:00)  T(F): 98.5 (29 May 2023 09:00), Max: 98.5 (29 May 2023 09:00)  HR: 85 (29 May 2023 09:00) (70 - 85)  BP: 111/68 (29 May 2023 09:00) (105/69 - 111/68)  BP(mean): --  RR: 18 (29 May 2023 09:00) (17 - 18)  SpO2: 99% (29 May 2023 09:00) (94% - 99%)    Parameters below as of 29 May 2023 09:00  Patient On (Oxygen Delivery Method): room air        GEN: female in NAD, appears comfortable, no diaphoresis  EYES: No scleral injection, PERRL, EOMI  ENTM: neck supple & symmetric without tracheal deviation, moist membranes, no gross hearing impairment, thyroid gland not enlarged  CV: +S1/S2, no m/r/g, no abdominal bruit, no LE edema  RESP: breathing comfortably, no respiratory accessory muscle use, CTAB, no w/r/r  GI: normoactive BS, soft, NTND, no rebounding/guarding, no palpable masses    LABS:                        7.4    6.72  )-----------( 243      ( 29 May 2023 05:30 )             24.2     05-29    140  |  102  |  10  ----------------------------<  114<H>  3.9   |  30  |  0.70    Ca    8.0<L>      29 May 2023 05:30                  RADIOLOGY & ADDITIONAL TESTS:  Results Reviewed:   Imaging Personally Reviewed:  Electrocardiogram Personally Reviewed:    COORDINATION OF CARE:  Care Discussed with Consultants/Other Providers [Y/N]:  Prior or Outpatient Records Reviewed [Y/N]:

## 2023-05-30 LAB
ANION GAP SERPL CALC-SCNC: 7 MMOL/L — SIGNIFICANT CHANGE UP (ref 5–17)
BUN SERPL-MCNC: 8 MG/DL — SIGNIFICANT CHANGE UP (ref 7–23)
CALCIUM SERPL-MCNC: 8.1 MG/DL — LOW (ref 8.4–10.5)
CHLORIDE SERPL-SCNC: 105 MMOL/L — SIGNIFICANT CHANGE UP (ref 96–108)
CO2 SERPL-SCNC: 29 MMOL/L — SIGNIFICANT CHANGE UP (ref 22–31)
CREAT SERPL-MCNC: 0.73 MG/DL — SIGNIFICANT CHANGE UP (ref 0.5–1.3)
EGFR: 95 ML/MIN/1.73M2 — SIGNIFICANT CHANGE UP
GLUCOSE SERPL-MCNC: 101 MG/DL — HIGH (ref 70–99)
HCT VFR BLD CALC: 22.9 % — LOW (ref 34.5–45)
HGB BLD-MCNC: 7.1 G/DL — LOW (ref 11.5–15.5)
MCHC RBC-ENTMCNC: 29.7 PG — SIGNIFICANT CHANGE UP (ref 27–34)
MCHC RBC-ENTMCNC: 31 GM/DL — LOW (ref 32–36)
MCV RBC AUTO: 95.8 FL — SIGNIFICANT CHANGE UP (ref 80–100)
NRBC # BLD: 0 /100 WBCS — SIGNIFICANT CHANGE UP (ref 0–0)
PLATELET # BLD AUTO: 240 K/UL — SIGNIFICANT CHANGE UP (ref 150–400)
POTASSIUM SERPL-MCNC: 4.8 MMOL/L — SIGNIFICANT CHANGE UP (ref 3.5–5.3)
POTASSIUM SERPL-SCNC: 4.8 MMOL/L — SIGNIFICANT CHANGE UP (ref 3.5–5.3)
RBC # BLD: 2.39 M/UL — LOW (ref 3.8–5.2)
RBC # FLD: 13.2 % — SIGNIFICANT CHANGE UP (ref 10.3–14.5)
SODIUM SERPL-SCNC: 141 MMOL/L — SIGNIFICANT CHANGE UP (ref 135–145)
WBC # BLD: 6.34 K/UL — SIGNIFICANT CHANGE UP (ref 3.8–10.5)
WBC # FLD AUTO: 6.34 K/UL — SIGNIFICANT CHANGE UP (ref 3.8–10.5)

## 2023-05-30 PROCEDURE — 99232 SBSQ HOSP IP/OBS MODERATE 35: CPT

## 2023-05-30 RX ADMIN — ATORVASTATIN CALCIUM 20 MILLIGRAM(S): 80 TABLET, FILM COATED ORAL at 21:40

## 2023-05-30 RX ADMIN — ESCITALOPRAM OXALATE 20 MILLIGRAM(S): 10 TABLET, FILM COATED ORAL at 14:36

## 2023-05-30 RX ADMIN — OXYCODONE HYDROCHLORIDE 5 MILLIGRAM(S): 5 TABLET ORAL at 09:08

## 2023-05-30 RX ADMIN — Medication 975 MILLIGRAM(S): at 06:21

## 2023-05-30 RX ADMIN — OXYCODONE HYDROCHLORIDE 5 MILLIGRAM(S): 5 TABLET ORAL at 15:21

## 2023-05-30 RX ADMIN — OXYCODONE HYDROCHLORIDE 5 MILLIGRAM(S): 5 TABLET ORAL at 21:39

## 2023-05-30 RX ADMIN — OXYCODONE HYDROCHLORIDE 5 MILLIGRAM(S): 5 TABLET ORAL at 09:59

## 2023-05-30 RX ADMIN — PANTOPRAZOLE SODIUM 40 MILLIGRAM(S): 20 TABLET, DELAYED RELEASE ORAL at 06:21

## 2023-05-30 RX ADMIN — OXYCODONE HYDROCHLORIDE 5 MILLIGRAM(S): 5 TABLET ORAL at 22:39

## 2023-05-30 RX ADMIN — POLYETHYLENE GLYCOL 3350 17 GRAM(S): 17 POWDER, FOR SOLUTION ORAL at 21:39

## 2023-05-30 RX ADMIN — GABAPENTIN 800 MILLIGRAM(S): 400 CAPSULE ORAL at 21:39

## 2023-05-30 RX ADMIN — SENNA PLUS 2 TABLET(S): 8.6 TABLET ORAL at 21:40

## 2023-05-30 RX ADMIN — Medication 975 MILLIGRAM(S): at 14:36

## 2023-05-30 RX ADMIN — Medication 975 MILLIGRAM(S): at 21:39

## 2023-05-30 RX ADMIN — Medication 325 MILLIGRAM(S): at 17:27

## 2023-05-30 RX ADMIN — Medication 975 MILLIGRAM(S): at 15:21

## 2023-05-30 RX ADMIN — Medication 325 MILLIGRAM(S): at 06:21

## 2023-05-30 RX ADMIN — Medication 975 MILLIGRAM(S): at 22:39

## 2023-05-30 RX ADMIN — OXYCODONE HYDROCHLORIDE 5 MILLIGRAM(S): 5 TABLET ORAL at 14:36

## 2023-05-30 RX ADMIN — Medication 1 TABLET(S): at 14:36

## 2023-05-30 NOTE — PROGRESS NOTE ADULT - SUBJECTIVE AND OBJECTIVE BOX
SUBJECTIVE: Patient seen and examined. Pt doing well o/n.  No f/c/n/v/cp/sob.     OBJECTIVE:  Vital Signs Last 24 Hrs  T(C): 36.5 (30 May 2023 05:00), Max: 36.9 (29 May 2023 09:00)  T(F): 97.7 (30 May 2023 05:00), Max: 98.5 (29 May 2023 09:00)  HR: 65 (30 May 2023 05:00) (65 - 85)  BP: 104/65 (30 May 2023 05:00) (100/62 - 111/68)  BP(mean): --  RR: 17 (30 May 2023 05:00) (17 - 19)  SpO2: 93% (30 May 2023 05:00) (93% - 99%)    Parameters below as of 30 May 2023 05:00  Patient On (Oxygen Delivery Method): room air        Affected extremity:          Dressing: clean/dry/intact            Sensation: SILT         Motor exam: 5/5 TA/GS/EHL         warm well perfused; capillary refill <3 seconds     LABS:                        7.1    6.34  )-----------( 240      ( 30 May 2023 05:30 )             22.9     05-30    141  |  105  |  8   ----------------------------<  101<H>  4.8   |  29  |  0.73    Ca    8.1<L>      30 May 2023 05:30          MEDICATIONS:acetaminophen     Tablet .. 975 milliGRAM(s) Oral every 8 hours  aluminum hydroxide/magnesium hydroxide/simethicone Suspension 30 milliLiter(s) Oral every 4 hours PRN  aspirin enteric coated 325 milliGRAM(s) Oral two times a day  atorvastatin 20 milliGRAM(s) Oral at bedtime  escitalopram 20 milliGRAM(s) Oral daily  gabapentin 800 milliGRAM(s) Oral at bedtime  HYDROmorphone  Injectable 0.5 milliGRAM(s) IV Push every 15 minutes PRN  lactated ringers. 1000 milliLiter(s) IV Continuous <Continuous>  magnesium hydroxide Suspension 30 milliLiter(s) Oral daily PRN  melatonin 5 milliGRAM(s) Oral at bedtime PRN  multivitamin 1 Tablet(s) Oral daily  ondansetron Injectable 4 milliGRAM(s) IV Push every 6 hours PRN  oxyCODONE    IR 5 milliGRAM(s) Oral every 4 hours PRN  oxyCODONE    IR 10 milliGRAM(s) Oral every 4 hours PRN  pantoprazole    Tablet 40 milliGRAM(s) Oral before breakfast  polyethylene glycol 3350 17 Gram(s) Oral at bedtime  senna 2 Tablet(s) Oral at bedtime  simethicone 80 milliGRAM(s) Chew every 8 hours PRN    Anticoagulation:  aspirin enteric coated 325 milliGRAM(s) Oral two times a day    Antibiotics:     Pain medications:   acetaminophen     Tablet .. 975 milliGRAM(s) Oral every 8 hours  escitalopram 20 milliGRAM(s) Oral daily  gabapentin 800 milliGRAM(s) Oral at bedtime  HYDROmorphone  Injectable 0.5 milliGRAM(s) IV Push every 15 minutes PRN  melatonin 5 milliGRAM(s) Oral at bedtime PRN  ondansetron Injectable 4 milliGRAM(s) IV Push every 6 hours PRN  oxyCODONE    IR 5 milliGRAM(s) Oral every 4 hours PRN  oxyCODONE    IR 10 milliGRAM(s) Oral every 4 hours PRN    A/P :  Pt is a 58yo Female s/p  POD # 6 Right femur IM nailing  -    Pain control  -    DVT ppx: ASA     -    Weight bearing status: PWB  -    Physical Therapy  -    Dispo: Home or rehab depending on recovery and response

## 2023-05-30 NOTE — PROGRESS NOTE ADULT - SUBJECTIVE AND OBJECTIVE BOX
Ortho AM Progress Note    Pt seen at bedside this AM. No issues overnight. Pain well controlled.   Denies CP, SOB, N/V, new numbness/tingling     Vital Signs Last 24 Hrs  T(C): 36.5 (30 May 2023 05:00), Max: 36.9 (29 May 2023 09:00)  T(F): 97.7 (30 May 2023 05:00), Max: 98.5 (29 May 2023 09:00)  HR: 65 (30 May 2023 05:00) (65 - 85)  BP: 104/65 (30 May 2023 05:00) (100/62 - 111/68)  BP(mean): --  RR: 17 (30 May 2023 05:00) (17 - 19)  SpO2: 93% (30 May 2023 05:00) (93% - 99%)    Parameters below as of 30 May 2023 05:00  Patient On (Oxygen Delivery Method): room air      Physical Exam:  General: Pt Alert and oriented, NAD  DSG alfred x2, ACE wrap in place  B/L LE: sensation intact, DP 2+, brisk cap refill, EHL/FHL/TA/GS 5/5          Plan of Care:  A/P: 57yFemale POD#6 s/p right periprosthetic distal femur s/p Right femur IMN   - afebrile  - F/u AM CBC  - Pain Control- gabapentin 800mg PO at bedtime, tylenol 975mg PO Q8h, Dilaudid 0.5mg Q4h prn breakthrough pain, Oxycodone 5-10mg PO Q4h prn moderate to severe pain   - DVT ppx: aspirin 325mg PO BID  - PT, WBS: PWBAT  - appreciate medicine recs  - bowel regimen, IS use, PPI   - Dispo- dual plan [facility] versus home  Ortho Pager [phone]

## 2023-05-30 NOTE — PROGRESS NOTE ADULT - SUBJECTIVE AND OBJECTIVE BOX
Ortho Note    Pt comfortable without complaints, pain controlled. Ambulated hallway with PT.  Denies CP, SOB, N/V, numbness/tingling, dizziness, lightheadedness.    Vital Signs Last 24 Hrs  T(C): 36.9 (05-30-23 @ 08:42), Max: 36.9 (05-30-23 @ 08:42)  T(F): 98.4 (05-30-23 @ 08:42), Max: 98.4 (05-30-23 @ 08:42)  HR: 87 (05-30-23 @ 08:42) (87 - 87)  BP: 101/65 (05-30-23 @ 08:42) (101/65 - 101/65)  BP(mean): --  RR: 16 (05-30-23 @ 08:42) (16 - 16)  SpO2: 95% (05-30-23 @ 08:42) (95% - 95%)  I&O's Summary    29 May 2023 07:01  -  30 May 2023 07:00  --------------------------------------------------------  IN: 0 mL / OUT: 300 mL / NET: -300 mL        General: Pt Alert and oriented, NAD  DSG C/D/I right LE  Pulses intact RLE  Sensation intact RLE  Motor: EHL/FHL/TA/GS 5/5 RLE                          7.1    6.34  )-----------( 240      ( 30 May 2023 05:30 )             22.9     05-30    141  |  105  |  8   ----------------------------<  101<H>  4.8   |  29  |  0.73    Ca    8.1<L>      30 May 2023 05:30        A/P: 57yFemale POD#6 s/p Right femur IMN  - Stable  - Pain Control  - DVT ppx: asa  - PT, WBS: PWB  - for home pending PT clearance  - trend h/h    Ortho Pager [phone]

## 2023-05-30 NOTE — PROGRESS NOTE ADULT - REASON FOR ADMISSION
Femur Fx
Femur fracture after h/o fall
H/o fall Femur fracture
Fall
Fall
Femur Fx
Right femur fracture
Fall & Right Femur Fx

## 2023-05-30 NOTE — PROGRESS NOTE ADULT - SUBJECTIVE AND OBJECTIVE BOX
INTERVAL HPI/OVERNIGHT EVENTS: dillon o/n    SUBJECTIVE: Patient seen and examined at bedside.   Pt laying comfortably in bed. States pain in R leg is controlled. No numbness. No chest pain, dyspnea.     OBJECTIVE:    VITAL SIGNS:  ICU Vital Signs Last 24 Hrs  T(C): 36.9 (30 May 2023 08:42), Max: 36.9 (30 May 2023 08:42)  T(F): 98.4 (30 May 2023 08:42), Max: 98.4 (30 May 2023 08:42)  HR: 87 (30 May 2023 08:42) (65 - 87)  BP: 101/65 (30 May 2023 08:42) (100/62 - 104/65)  BP(mean): --  ABP: --  ABP(mean): --  RR: 16 (30 May 2023 08:42) (16 - 19)  SpO2: 95% (30 May 2023 08:42) (93% - 97%)    O2 Parameters below as of 30 May 2023 08:42  Patient On (Oxygen Delivery Method): room air              05-29 @ 07:01  -  05-30 @ 07:00  --------------------------------------------------------  IN: 0 mL / OUT: 300 mL / NET: -300 mL      CAPILLARY BLOOD GLUCOSE          PHYSICAL EXAM:  GEN: female in NAD on RA  HEENT: NC/AT, MMM  CV: RRR, nml S1S2, no murmurs  PULM: nml effort, CTAB  ABD: Soft, non-distended, NABS, non-tender  NEURO  A/O x3, moving all extremities, Sensation intact  SKIN: R thigh dressing c/d/i. 5/5 in b/l plantarflex/ext  PSYCH: Appropriate      MEDICATIONS:  MEDICATIONS  (STANDING):  acetaminophen     Tablet .. 975 milliGRAM(s) Oral every 8 hours  aspirin enteric coated 325 milliGRAM(s) Oral two times a day  atorvastatin 20 milliGRAM(s) Oral at bedtime  escitalopram 20 milliGRAM(s) Oral daily  gabapentin 800 milliGRAM(s) Oral at bedtime  lactated ringers. 1000 milliLiter(s) (100 mL/Hr) IV Continuous <Continuous>  multivitamin 1 Tablet(s) Oral daily  pantoprazole    Tablet 40 milliGRAM(s) Oral before breakfast  polyethylene glycol 3350 17 Gram(s) Oral at bedtime  senna 2 Tablet(s) Oral at bedtime    MEDICATIONS  (PRN):  aluminum hydroxide/magnesium hydroxide/simethicone Suspension 30 milliLiter(s) Oral every 4 hours PRN Dyspepsia  HYDROmorphone  Injectable 0.5 milliGRAM(s) IV Push every 15 minutes PRN breakthrough pain  magnesium hydroxide Suspension 30 milliLiter(s) Oral daily PRN Constipation  melatonin 5 milliGRAM(s) Oral at bedtime PRN Sleep  ondansetron Injectable 4 milliGRAM(s) IV Push every 6 hours PRN Nausea and/or Vomiting  oxyCODONE    IR 5 milliGRAM(s) Oral every 4 hours PRN Moderate Pain (4 - 6)  oxyCODONE    IR 10 milliGRAM(s) Oral every 4 hours PRN Severe Pain (7 - 10)  simethicone 80 milliGRAM(s) Chew every 8 hours PRN Heartburn      ALLERGIES:  Allergies    No Known Allergies    Intolerances        LABS:                        7.1    6.34  )-----------( 240      ( 30 May 2023 05:30 )             22.9     05-30    141  |  105  |  8   ----------------------------<  101<H>  4.8   |  29  |  0.73    Ca    8.1<L>      30 May 2023 05:30            RADIOLOGY & ADDITIONAL TESTS: Reviewed.

## 2023-05-31 LAB
24R-OH-CALCIDIOL SERPL-MCNC: 44.6 NG/ML — SIGNIFICANT CHANGE UP (ref 30–80)
ANION GAP SERPL CALC-SCNC: 11 MMOL/L — SIGNIFICANT CHANGE UP (ref 5–17)
BUN SERPL-MCNC: 8 MG/DL — SIGNIFICANT CHANGE UP (ref 7–23)
CALCIUM SERPL-MCNC: 7.9 MG/DL — LOW (ref 8.4–10.5)
CALCIUM SERPL-MCNC: 8 MG/DL — LOW (ref 8.4–10.5)
CHLORIDE SERPL-SCNC: 104 MMOL/L — SIGNIFICANT CHANGE UP (ref 96–108)
CO2 SERPL-SCNC: 25 MMOL/L — SIGNIFICANT CHANGE UP (ref 22–31)
CREAT SERPL-MCNC: 0.67 MG/DL — SIGNIFICANT CHANGE UP (ref 0.5–1.3)
EGFR: 101 ML/MIN/1.73M2 — SIGNIFICANT CHANGE UP
GLUCOSE SERPL-MCNC: 98 MG/DL — SIGNIFICANT CHANGE UP (ref 70–99)
HCT VFR BLD CALC: 24.6 % — LOW (ref 34.5–45)
HGB BLD-MCNC: 7.5 G/DL — LOW (ref 11.5–15.5)
MCHC RBC-ENTMCNC: 29.2 PG — SIGNIFICANT CHANGE UP (ref 27–34)
MCHC RBC-ENTMCNC: 30.5 GM/DL — LOW (ref 32–36)
MCV RBC AUTO: 95.7 FL — SIGNIFICANT CHANGE UP (ref 80–100)
NRBC # BLD: 0 /100 WBCS — SIGNIFICANT CHANGE UP (ref 0–0)
PLATELET # BLD AUTO: 251 K/UL — SIGNIFICANT CHANGE UP (ref 150–400)
POTASSIUM SERPL-MCNC: 4.2 MMOL/L — SIGNIFICANT CHANGE UP (ref 3.5–5.3)
POTASSIUM SERPL-SCNC: 4.2 MMOL/L — SIGNIFICANT CHANGE UP (ref 3.5–5.3)
PTH-INTACT FLD-MCNC: 57 PG/ML — SIGNIFICANT CHANGE UP (ref 15–65)
RBC # BLD: 2.57 M/UL — LOW (ref 3.8–5.2)
RBC # FLD: 13.3 % — SIGNIFICANT CHANGE UP (ref 10.3–14.5)
SODIUM SERPL-SCNC: 140 MMOL/L — SIGNIFICANT CHANGE UP (ref 135–145)
WBC # BLD: 6.97 K/UL — SIGNIFICANT CHANGE UP (ref 3.8–10.5)
WBC # FLD AUTO: 6.97 K/UL — SIGNIFICANT CHANGE UP (ref 3.8–10.5)

## 2023-05-31 PROCEDURE — 99232 SBSQ HOSP IP/OBS MODERATE 35: CPT

## 2023-05-31 RX ORDER — OXYCODONE HYDROCHLORIDE 5 MG/1
5 TABLET ORAL EVERY 4 HOURS
Refills: 0 | Status: DISCONTINUED | OUTPATIENT
Start: 2023-05-31 | End: 2023-06-02

## 2023-05-31 RX ORDER — SODIUM CHLORIDE 9 MG/ML
500 INJECTION INTRAMUSCULAR; INTRAVENOUS; SUBCUTANEOUS ONCE
Refills: 0 | Status: COMPLETED | OUTPATIENT
Start: 2023-05-31 | End: 2023-05-31

## 2023-05-31 RX ORDER — OXYCODONE HYDROCHLORIDE 5 MG/1
10 TABLET ORAL EVERY 4 HOURS
Refills: 0 | Status: DISCONTINUED | OUTPATIENT
Start: 2023-05-31 | End: 2023-06-02

## 2023-05-31 RX ADMIN — SODIUM CHLORIDE 500 MILLILITER(S): 9 INJECTION INTRAMUSCULAR; INTRAVENOUS; SUBCUTANEOUS at 16:18

## 2023-05-31 RX ADMIN — PANTOPRAZOLE SODIUM 40 MILLIGRAM(S): 20 TABLET, DELAYED RELEASE ORAL at 05:47

## 2023-05-31 RX ADMIN — ESCITALOPRAM OXALATE 20 MILLIGRAM(S): 10 TABLET, FILM COATED ORAL at 12:04

## 2023-05-31 RX ADMIN — GABAPENTIN 800 MILLIGRAM(S): 400 CAPSULE ORAL at 21:36

## 2023-05-31 RX ADMIN — ATORVASTATIN CALCIUM 20 MILLIGRAM(S): 80 TABLET, FILM COATED ORAL at 21:36

## 2023-05-31 RX ADMIN — Medication 975 MILLIGRAM(S): at 22:36

## 2023-05-31 RX ADMIN — Medication 975 MILLIGRAM(S): at 05:47

## 2023-05-31 RX ADMIN — Medication 325 MILLIGRAM(S): at 05:47

## 2023-05-31 RX ADMIN — Medication 1 TABLET(S): at 12:05

## 2023-05-31 RX ADMIN — SENNA PLUS 2 TABLET(S): 8.6 TABLET ORAL at 21:37

## 2023-05-31 RX ADMIN — OXYCODONE HYDROCHLORIDE 10 MILLIGRAM(S): 5 TABLET ORAL at 05:50

## 2023-05-31 RX ADMIN — OXYCODONE HYDROCHLORIDE 5 MILLIGRAM(S): 5 TABLET ORAL at 16:07

## 2023-05-31 RX ADMIN — OXYCODONE HYDROCHLORIDE 5 MILLIGRAM(S): 5 TABLET ORAL at 15:07

## 2023-05-31 RX ADMIN — OXYCODONE HYDROCHLORIDE 10 MILLIGRAM(S): 5 TABLET ORAL at 06:50

## 2023-05-31 RX ADMIN — Medication 975 MILLIGRAM(S): at 21:36

## 2023-05-31 RX ADMIN — Medication 975 MILLIGRAM(S): at 06:47

## 2023-05-31 RX ADMIN — Medication 975 MILLIGRAM(S): at 13:08

## 2023-05-31 RX ADMIN — Medication 325 MILLIGRAM(S): at 17:33

## 2023-05-31 RX ADMIN — OXYCODONE HYDROCHLORIDE 5 MILLIGRAM(S): 5 TABLET ORAL at 23:29

## 2023-05-31 RX ADMIN — Medication 975 MILLIGRAM(S): at 14:08

## 2023-05-31 RX ADMIN — POLYETHYLENE GLYCOL 3350 17 GRAM(S): 17 POWDER, FOR SOLUTION ORAL at 21:37

## 2023-05-31 NOTE — PROGRESS NOTE ADULT - SUBJECTIVE AND OBJECTIVE BOX
INTERVAL HPI/OVERNIGHT EVENTS: dillon o/n    SUBJECTIVE: Patient seen and examined at bedside.   Pt feels well - walked a bit w PT today. Denies LH/DIzziness but still feels a little unsteady d/t pain. Otherwise no chest pain, dyspnea, nausea, abd pain.     OBJECTIVE:    VITAL SIGNS:  ICU Vital Signs Last 24 Hrs  T(C): 37.5 (31 May 2023 08:42), Max: 37.5 (31 May 2023 08:42)  T(F): 99.5 (31 May 2023 08:42), Max: 99.5 (31 May 2023 08:42)  HR: 81 (31 May 2023 08:42) (64 - 81)  BP: 111/70 (31 May 2023 08:42) (111/70 - 118/78)  BP(mean): --  ABP: --  ABP(mean): --  RR: 17 (31 May 2023 08:42) (16 - 17)  SpO2: 98% (31 May 2023 08:42) (94% - 98%)    O2 Parameters below as of 31 May 2023 08:42  Patient On (Oxygen Delivery Method): room air              05-30 @ 07:01  -  05-31 @ 07:00  --------------------------------------------------------  IN: 0 mL / OUT: 600 mL / NET: -600 mL    05-31 @ 07:01  -  05-31 @ 16:08  --------------------------------------------------------  IN: 320 mL / OUT: 500 mL / NET: -180 mL      CAPILLARY BLOOD GLUCOSE          PHYSICAL EXAM:  GEN: female in NAD on RA  HEENT: NC/AT, MMM  CV: RRR, nml S1S2, no murmurs  PULM: nml effort, CTAB  ABD: Soft, non-distended, NABS, non-tender  NEURO  A/O x3, moving all extremities, Sensation intact  SKIN: R thigh dressing c/d/i. 5/5 in b/l plantarflex/ext  PSYCH: Appropriate      MEDICATIONS:  MEDICATIONS  (STANDING):  acetaminophen     Tablet .. 975 milliGRAM(s) Oral every 8 hours  aspirin enteric coated 325 milliGRAM(s) Oral two times a day  atorvastatin 20 milliGRAM(s) Oral at bedtime  escitalopram 20 milliGRAM(s) Oral daily  gabapentin 800 milliGRAM(s) Oral at bedtime  multivitamin 1 Tablet(s) Oral daily  pantoprazole    Tablet 40 milliGRAM(s) Oral before breakfast  polyethylene glycol 3350 17 Gram(s) Oral at bedtime  senna 2 Tablet(s) Oral at bedtime    MEDICATIONS  (PRN):  aluminum hydroxide/magnesium hydroxide/simethicone Suspension 30 milliLiter(s) Oral every 4 hours PRN Dyspepsia  magnesium hydroxide Suspension 30 milliLiter(s) Oral daily PRN Constipation  melatonin 5 milliGRAM(s) Oral at bedtime PRN Sleep  ondansetron Injectable 4 milliGRAM(s) IV Push every 6 hours PRN Nausea and/or Vomiting  oxyCODONE    IR 10 milliGRAM(s) Oral every 4 hours PRN Severe Pain (7 - 10)  oxyCODONE    IR 5 milliGRAM(s) Oral every 4 hours PRN Moderate Pain (4 - 6)  simethicone 80 milliGRAM(s) Chew every 8 hours PRN Heartburn      ALLERGIES:  Allergies    No Known Allergies    Intolerances        LABS:                        7.5    6.97  )-----------( 251      ( 31 May 2023 05:30 )             24.6     05-31    140  |  104  |  8   ----------------------------<  98  4.2   |  25  |  0.67    Ca    8.0<L>      31 May 2023 05:30            RADIOLOGY & ADDITIONAL TESTS: Reviewed.

## 2023-05-31 NOTE — PROGRESS NOTE ADULT - SUBJECTIVE AND OBJECTIVE BOX
Ortho Note    Pt comfortable without complaints, pain controlled. Ambulated hallway with PT without issue.  Denies CP, SOB, N/V, numbness/tingling     Vital Signs Last 24 Hrs  T(C): 37.5 (05-31-23 @ 08:42), Max: 37.5 (05-31-23 @ 08:42)  T(F): 99.5 (05-31-23 @ 08:42), Max: 99.5 (05-31-23 @ 08:42)  HR: 81 (05-31-23 @ 08:42) (81 - 81)  BP: 111/70 (05-31-23 @ 08:42) (111/70 - 111/70)  BP(mean): --  RR: 17 (05-31-23 @ 08:42) (17 - 17)  SpO2: 98% (05-31-23 @ 08:42) (98% - 98%)  I&O's Summary    30 May 2023 07:01  -  31 May 2023 07:00  --------------------------------------------------------  IN: 0 mL / OUT: 600 mL / NET: -600 mL    31 May 2023 07:01  -  31 May 2023 12:10  --------------------------------------------------------  IN: 320 mL / OUT: 500 mL / NET: -180 mL        General: Pt Alert and oriented, NAD  DSG Right hip with YINA x 2, proximal with mild-moderate saturation  Pulses intact RLE  Sensation intact RLE  Motor: EHL/FHL/TA/GS 5/5 RLE                          7.5    6.97  )-----------( 251      ( 31 May 2023 05:30 )             24.6     05-31    140  |  104  |  8   ----------------------------<  98  4.2   |  25  |  0.67    Ca    8.0<L>      31 May 2023 05:30        A/P: 57yFemale POD#7 s/p Right femur IMN  - Stable  - Pain Control  - DVT ppx: asa  - PT, WBS: PWB RLE    Ortho Pager [phone]

## 2023-05-31 NOTE — PROGRESS NOTE ADULT - SUBJECTIVE AND OBJECTIVE BOX
Ortho Note    Due to patients current condition and weight bearing restrictions after orthopedic surgery, will require a commode.

## 2023-05-31 NOTE — PROGRESS NOTE ADULT - SUBJECTIVE AND OBJECTIVE BOX
SUBJECTIVE: Patient seen and examined. Pt doing well o/n.  No f/c/n/v/cp/sob.     OBJECTIVE:  Vital Signs Last 24 Hrs  T(C): 36.7 (31 May 2023 16:07), Max: 37.5 (31 May 2023 08:42)  T(F): 98.1 (31 May 2023 16:07), Max: 99.5 (31 May 2023 08:42)  HR: 68 (31 May 2023 17:30) (61 - 81)  BP: 104/70 (31 May 2023 17:30) (87/53 - 118/78)  BP(mean): --  RR: 17 (31 May 2023 17:30) (16 - 17)  SpO2: 95% (31 May 2023 17:30) (94% - 98%)    Parameters below as of 31 May 2023 17:30  Patient On (Oxygen Delivery Method): room air        Affected extremity:          Dressing: clean/dry/intact            Sensation: SILT         Motor exam: 5/5 TA/GS/EHL         warm well perfused; capillary refill <3 seconds     LABS:                        7.5    6.97  )-----------( 251      ( 31 May 2023 05:30 )             24.6     05-31    140  |  104  |  8   ----------------------------<  98  4.2   |  25  |  0.67    Ca    8.0<L>      31 May 2023 05:30          MEDICATIONS:acetaminophen     Tablet .. 975 milliGRAM(s) Oral every 8 hours  aluminum hydroxide/magnesium hydroxide/simethicone Suspension 30 milliLiter(s) Oral every 4 hours PRN  aspirin enteric coated 325 milliGRAM(s) Oral two times a day  atorvastatin 20 milliGRAM(s) Oral at bedtime  escitalopram 20 milliGRAM(s) Oral daily  gabapentin 800 milliGRAM(s) Oral at bedtime  magnesium hydroxide Suspension 30 milliLiter(s) Oral daily PRN  melatonin 5 milliGRAM(s) Oral at bedtime PRN  multivitamin 1 Tablet(s) Oral daily  ondansetron Injectable 4 milliGRAM(s) IV Push every 6 hours PRN  oxyCODONE    IR 5 milliGRAM(s) Oral every 4 hours PRN  oxyCODONE    IR 10 milliGRAM(s) Oral every 4 hours PRN  pantoprazole    Tablet 40 milliGRAM(s) Oral before breakfast  polyethylene glycol 3350 17 Gram(s) Oral at bedtime  senna 2 Tablet(s) Oral at bedtime  simethicone 80 milliGRAM(s) Chew every 8 hours PRN    Anticoagulation:  aspirin enteric coated 325 milliGRAM(s) Oral two times a day    Antibiotics:     Pain medications:   acetaminophen     Tablet .. 975 milliGRAM(s) Oral every 8 hours  escitalopram 20 milliGRAM(s) Oral daily  gabapentin 800 milliGRAM(s) Oral at bedtime  melatonin 5 milliGRAM(s) Oral at bedtime PRN  ondansetron Injectable 4 milliGRAM(s) IV Push every 6 hours PRN  oxyCODONE    IR 5 milliGRAM(s) Oral every 4 hours PRN  oxyCODONE    IR 10 milliGRAM(s) Oral every 4 hours PRN    A/P :  Pt is a 58yo Female s/p  POD # 7 Right femur IM nailing  -    Pain control  -    DVT ppx: ASA     -    Weight bearing status: PWB   -    Physical Therapy  -    Dispo: Home

## 2023-06-01 LAB
ANION GAP SERPL CALC-SCNC: 12 MMOL/L — SIGNIFICANT CHANGE UP (ref 5–17)
BUN SERPL-MCNC: 10 MG/DL — SIGNIFICANT CHANGE UP (ref 7–23)
CALCIUM SERPL-MCNC: 7.7 MG/DL — LOW (ref 8.4–10.5)
CHLORIDE SERPL-SCNC: 105 MMOL/L — SIGNIFICANT CHANGE UP (ref 96–108)
CO2 SERPL-SCNC: 25 MMOL/L — SIGNIFICANT CHANGE UP (ref 22–31)
CREAT SERPL-MCNC: 0.72 MG/DL — SIGNIFICANT CHANGE UP (ref 0.5–1.3)
EGFR: 96 ML/MIN/1.73M2 — SIGNIFICANT CHANGE UP
GLUCOSE SERPL-MCNC: 104 MG/DL — HIGH (ref 70–99)
HCT VFR BLD CALC: 26.1 % — LOW (ref 34.5–45)
HGB BLD-MCNC: 8 G/DL — LOW (ref 11.5–15.5)
MCHC RBC-ENTMCNC: 29.7 PG — SIGNIFICANT CHANGE UP (ref 27–34)
MCHC RBC-ENTMCNC: 30.7 GM/DL — LOW (ref 32–36)
MCV RBC AUTO: 97 FL — SIGNIFICANT CHANGE UP (ref 80–100)
NRBC # BLD: 0 /100 WBCS — SIGNIFICANT CHANGE UP (ref 0–0)
PLATELET # BLD AUTO: 314 K/UL — SIGNIFICANT CHANGE UP (ref 150–400)
POTASSIUM SERPL-MCNC: 4.2 MMOL/L — SIGNIFICANT CHANGE UP (ref 3.5–5.3)
POTASSIUM SERPL-SCNC: 4.2 MMOL/L — SIGNIFICANT CHANGE UP (ref 3.5–5.3)
RBC # BLD: 2.69 M/UL — LOW (ref 3.8–5.2)
RBC # FLD: 13.7 % — SIGNIFICANT CHANGE UP (ref 10.3–14.5)
SODIUM SERPL-SCNC: 142 MMOL/L — SIGNIFICANT CHANGE UP (ref 135–145)
WBC # BLD: 7.87 K/UL — SIGNIFICANT CHANGE UP (ref 3.8–10.5)
WBC # FLD AUTO: 7.87 K/UL — SIGNIFICANT CHANGE UP (ref 3.8–10.5)

## 2023-06-01 PROCEDURE — 99232 SBSQ HOSP IP/OBS MODERATE 35: CPT

## 2023-06-01 RX ADMIN — Medication 975 MILLIGRAM(S): at 22:14

## 2023-06-01 RX ADMIN — Medication 325 MILLIGRAM(S): at 17:03

## 2023-06-01 RX ADMIN — OXYCODONE HYDROCHLORIDE 10 MILLIGRAM(S): 5 TABLET ORAL at 20:36

## 2023-06-01 RX ADMIN — Medication 975 MILLIGRAM(S): at 05:22

## 2023-06-01 RX ADMIN — PANTOPRAZOLE SODIUM 40 MILLIGRAM(S): 20 TABLET, DELAYED RELEASE ORAL at 05:22

## 2023-06-01 RX ADMIN — Medication 975 MILLIGRAM(S): at 13:23

## 2023-06-01 RX ADMIN — SENNA PLUS 2 TABLET(S): 8.6 TABLET ORAL at 22:15

## 2023-06-01 RX ADMIN — OXYCODONE HYDROCHLORIDE 5 MILLIGRAM(S): 5 TABLET ORAL at 08:36

## 2023-06-01 RX ADMIN — GABAPENTIN 800 MILLIGRAM(S): 400 CAPSULE ORAL at 22:14

## 2023-06-01 RX ADMIN — ATORVASTATIN CALCIUM 20 MILLIGRAM(S): 80 TABLET, FILM COATED ORAL at 22:15

## 2023-06-01 RX ADMIN — MAGNESIUM HYDROXIDE 30 MILLILITER(S): 400 TABLET, CHEWABLE ORAL at 05:24

## 2023-06-01 RX ADMIN — OXYCODONE HYDROCHLORIDE 10 MILLIGRAM(S): 5 TABLET ORAL at 02:09

## 2023-06-01 RX ADMIN — OXYCODONE HYDROCHLORIDE 5 MILLIGRAM(S): 5 TABLET ORAL at 01:09

## 2023-06-01 RX ADMIN — OXYCODONE HYDROCHLORIDE 5 MILLIGRAM(S): 5 TABLET ORAL at 09:36

## 2023-06-01 RX ADMIN — Medication 325 MILLIGRAM(S): at 05:22

## 2023-06-01 RX ADMIN — ESCITALOPRAM OXALATE 20 MILLIGRAM(S): 10 TABLET, FILM COATED ORAL at 11:03

## 2023-06-01 RX ADMIN — Medication 1 TABLET(S): at 11:03

## 2023-06-01 RX ADMIN — OXYCODONE HYDROCHLORIDE 5 MILLIGRAM(S): 5 TABLET ORAL at 00:29

## 2023-06-01 RX ADMIN — OXYCODONE HYDROCHLORIDE 10 MILLIGRAM(S): 5 TABLET ORAL at 21:36

## 2023-06-01 RX ADMIN — Medication 975 MILLIGRAM(S): at 23:14

## 2023-06-01 RX ADMIN — Medication 975 MILLIGRAM(S): at 06:22

## 2023-06-01 NOTE — PROGRESS NOTE ADULT - SUBJECTIVE AND OBJECTIVE BOX
Ortho AM Progress Note    Seen at bedside, DEL.   Denies CP, SOB, N/V, numbness/tingling     Vital Signs Last 24 Hrs  T(C): 36.9 (01 Jun 2023 05:13), Max: 37.5 (31 May 2023 08:42)  T(F): 98.5 (01 Jun 2023 05:13), Max: 99.5 (31 May 2023 08:42)  HR: 73 (01 Jun 2023 05:13) (61 - 81)  BP: 121/70 (01 Jun 2023 05:13) (87/53 - 121/70)  BP(mean): --  RR: 15 (01 Jun 2023 05:13) (15 - 17)  SpO2: 96% (01 Jun 2023 05:13) (94% - 98%)    Parameters below as of 01 Jun 2023 05:13  Patient On (Oxygen Delivery Method): room air      General: Pt Alert and oriented, NAD  DSG Right hip with YINA x 2, proximal with mild-moderate saturation  Pulses intact RLE  Sensation intact RLE  Motor: EHL/FHL/TA/GS 5/5 RLE        A/P: 57yFemale POD#8 s/p Right femur IMN  - Stable  - Pain Control  - DVT ppx: asa  - PT, WBS: PWB RLE    Ortho Pager [phone]

## 2023-06-01 NOTE — PROGRESS NOTE ADULT - SUBJECTIVE AND OBJECTIVE BOX
Ortho Note    Subjective:  Pt comfortable without complaints, pain controlled with current pain medication regimen.   Denies CP, SOB, N/V, numbness/tingling   Reviewed plan of care with patient at bedside      Vital Signs Last 24 Hrs  T(C): 36.6 (06-01-23 @ 08:33), Max: 36.9 (06-01-23 @ 05:13)  T(F): 97.8 (06-01-23 @ 08:33), Max: 98.5 (06-01-23 @ 05:13)  HR: 62 (06-01-23 @ 08:33) (62 - 73)  BP: 132/80 (06-01-23 @ 08:33) (121/70 - 132/80)  BP(mean): --  RR: 18 (06-01-23 @ 08:33) (15 - 18)  SpO2: 98% (06-01-23 @ 08:33) (96% - 98%)  AVSS    Objective:    Physical Exam:  General: Pt Alert and oriented, NAD  Right HIp YINA DSG C/D/I  Pulses: +2 pedal pulses, wwp toes   Sensation: silt intact bilateral lower extremities  Motor: EHL/FHL/TA/GS- 5/5 bilateral lower extremities        Plan of Care:  A/P: 57yFemale POD#8 s/p Right femur IMN   - afebrile  - Pain Control- Oxycodone 5-10mg PO Q4h prn moderate to severe pain, gabapentin 800mg PO at bedtime, tylenol 975mg Po Q8h,   - DVT ppx: aspirin 325mg PO BID  - PT, WBS: PWB RLE  - appreciate medicine recs  - ambulate with PT as tolerated  - bowel regimen, IS use, PPI  - Dispo- dual plan home versus [facility]     Ortho Pager [phone]

## 2023-06-01 NOTE — PROGRESS NOTE ADULT - ASSESSMENT
57F w R TKR p/w fall found to have R distal femur fx – s/p IMN 5/24 w Dr. Cole, for HPT    #Post-op state - pain controlled. PPx: On  mg BID. c/w bowel regimen  #R IT fx   - on gabapentin 800 HS. Oxycodone 5/10 mg q4h for mod/severe pain  #Depression - c/w lexapro 10mg daily  #Blood loss Anemia - hgb 7.1 from 7.4. Asymptomatic at this time. Suspect likely d/t recent trauma  #Obesity - BMI 36. Affects all aspects of care  #Dyspepsia - improved today - on PPI 40 daily    Plan  Monitor for orthostasis, decreased energy. CBC w diff in AM.     DISPO: ASIF - pending clearing PT
57F with PMHx of right knee arthroplasty recently presenting for fall and found to have right intertrochanteric fracture. She is now s/p IMN on 5/24.    # Right Femur Fracture s/p IMN on 5/24/23   - pain control , DVT prophylaxis , Weightbearing status , Dressing changes and drain care per ortho team    # Chest pain , most likely dyspepsia   - EKG no changes compared to time of admission , Troponin  negative , chest pain resolved with Mylanta , BP and Pulses  Equal bilaterally , no new murmurs , no tachycardia or hypoxia , no fever , lungs clear bilaterally , no  recurrence after patient started eating     # HLD   - Continue Atorvastatin     # Acute BLood Loss Anemia   - Transfuse if HGB, 7gm/dl     Dispo: ASIF
A/P :  Pt is a 56yo Female s/p  POD # 7 Right femur IM nailing  -    Pain control  -    DVT ppx: ASA     -    Weight bearing status: PWB   -    Physical Therapy  -    Dispo: Home  
57F w R TKR p/w fall found to have R distal femur fx – s/p IMN 5/24 w Dr. Cole, for HPT    #Post-op state - pain controlled. PPx: On  mg BID. c/w bowel regimen  #R IT fx   - on gabapentin 800 HS. Oxycodone 5/10 mg q4h for mod/severe pain   - 25,OH-D at target 44.6.  #Depression - c/w lexapro 10mg daily  #Blood loss Anemia - hgb 8 today from 7.5. Asymptomatic at this time. Suspect likely d/t recent trauma  #Obesity - BMI 36. Affects all aspects of care  #Dyspepsia - on PPI 40 daily    Plan  Hgb improving. Continue working w PT to clear.     DISPO: HPT - pending clearing PT  Outpatient: Recommend f/u CBC in 4wk
57F with PMHx of right knee arthroplasty recently presenting for fall and found to have right intertrochanteric fracture. She is now s/p IMN on 5/24.      # Right Femur Fracture s/p IMN on 5/24/23   - pain control , DVT prophylaxis , Weightbearing status , Dressing changes and drain care per ortho team    # Chest pain , most likely dyspepsia   - EKG no changes compared to time of admission , Troponin  negative , chest pain resolved with Mylanta , BP and Pulses  Equal bilaterally , no new murmurs , no tachycardia or hypoxia , no fever , lungs clear bilaterally , no  recurrence after patient started eating     # HLD   - Continue Atorvastatin     # Acute BLood Loss Anemia   - Transfuse if HGB, 7gm/dl     Dispo: ASIF
57F w R TKR p/w fall found to have R distal femur fx – s/p IMN 5/24 w Dr. Cole, for HPT    #Post-op state - pain controlled. PPx: On  mg BID. c/w bowel regimen  #R IT fx   - on gabapentin 800 HS. Oxycodone 5/10 mg q4h for mod/severe pain   - 25,OH-D at target 44.6.  #Depression - c/w lexapro 10mg daily  #Blood loss Anemia - hgb 7.5 from 7.1 from 7.4. Asymptomatic at this time. Suspect likely d/t recent trauma  #Obesity - BMI 36. Affects all aspects of care  #Dyspepsia - on PPI 40 daily    Plan  Monitor for orthostasis, decreased energy. CBC w diff in AM.     DISPO: HPT - pending clearing PT  Outpatient: Recommend f/u CBC in 4wk      
57F with PMHx of right knee arthroplasty recently presenting for fall and found to have right intertrochanteric fracture. She is now s/p IMN on 5/24.    # Right Femur Fracture s/p IMN on 5/24/23   - pain control , DVT prophylaxis , Weightbearing status , Dressing changes and drain care per ortho team    # Chest pain , most likely dyspepsia   - EKG no changes compared to time of admission , Troponin  negative , chest pain resolved with Mylanta , BP and Pulses  Equal bilaterally , no new murmurs , no tachycardia or hypoxia , no fever , lungs clear bilaterally , no  recurrence after patient started eating     # HLD   - Continue Atorvastatin     # Acute BLood Loss Anemia   - Transfuse if HGB, 7gm/dl     Dispo: ASIF vs. Home PT
A/P :  Pt is a 56yo Female s/p  POD # 3 Right Femur IM nailing  -    Pain control  -    DVT ppx: ASA     -    Weight bearing status: PWB  -    Physical Therapy  -    Dispo: Home or rehab depending on recovery
Patient is a 57y Female presenting with R femur fx      Impression and Plan   # Right Femur Fracture s/p IMN on 5/24/23   - pain control , DVT prophylaxis , Weightbearing status , Dressing changes and drain care per ortho team    # Chest pain , most likely dyspepsia   - EKG no changes compared to time of admission , Troponin  negative , chest pain resolved with Mylanta , BP and Pulses  Equal bilaterally , no new murmurs , no tachycardia or hypoxia , no fever , lungs clear bilaterally , no  recurrence after patient started eating     # HLD   - Continue Atorvastatin     # Acute BLood Loss Anemia   - Transfuse if HGB , 7gm/dl     
Patient is a 57y Female presenting with R femur fx      Impression and Plan   # Right Femur Fracture s/p IMN on 5/24/23   - pain control , DVT prophylaxis , Weightbearing status , Dressing changes and drain care per ortho team    # Chest pain , most likely dyspepsia   - EKG no changes compared to time of admission , Troponin x 1 negative , chest pain resolved with Mylanta , BP and Pulses  Equal bilaterally , no new murmurs , no tachycardia or hypoxia , no fever , lungs clear bilaterally     # HLD   - Continue Atorvastatin     # Acute BLood Loss Anemia   - Transfuse if HGB , 7gm/dl

## 2023-06-01 NOTE — PROGRESS NOTE ADULT - SUBJECTIVE AND OBJECTIVE BOX
INTERVAL HPI/OVERNIGHT EVENTS: dillon o/n    SUBJECTIVE: Patient seen and examined at bedside.   pt feels well. Eager to mobilize more today. No chest pain, dyspnea, LH/DIzziness. States pain is slightly improved in R leg vs yesterday. Passing BM and voiding.     OBJECTIVE:    VITAL SIGNS:  ICU Vital Signs Last 24 Hrs  T(C): 36.6 (01 Jun 2023 08:33), Max: 36.9 (01 Jun 2023 05:13)  T(F): 97.8 (01 Jun 2023 08:33), Max: 98.5 (01 Jun 2023 05:13)  HR: 62 (01 Jun 2023 08:33) (61 - 73)  BP: 132/80 (01 Jun 2023 08:33) (87/53 - 132/80)  BP(mean): --  ABP: --  ABP(mean): --  RR: 18 (01 Jun 2023 08:33) (15 - 18)  SpO2: 98% (01 Jun 2023 08:33) (94% - 98%)    O2 Parameters below as of 01 Jun 2023 08:33  Patient On (Oxygen Delivery Method): room air              05-31 @ 07:01  -  06-01 @ 07:00  --------------------------------------------------------  IN: 800 mL / OUT: 1450 mL / NET: -650 mL      CAPILLARY BLOOD GLUCOSE          PHYSICAL EXAM:  GEN: female in NAD on RA  HEENT: NC/AT, MMM  CV: RRR, nml S1S2, no murmurs  PULM: nml effort, CTAB  ABD: Soft, non-distended, NABS, non-tender  NEURO  A/O x3, moving all extremities, Sensation intact  SKIN: R thigh dressing c/d/i. 5/5 in b/l plantarflex/ext  PSYCH: Appropriate    MEDICATIONS:  MEDICATIONS  (STANDING):  acetaminophen     Tablet .. 975 milliGRAM(s) Oral every 8 hours  aspirin enteric coated 325 milliGRAM(s) Oral two times a day  atorvastatin 20 milliGRAM(s) Oral at bedtime  escitalopram 20 milliGRAM(s) Oral daily  gabapentin 800 milliGRAM(s) Oral at bedtime  multivitamin 1 Tablet(s) Oral daily  pantoprazole    Tablet 40 milliGRAM(s) Oral before breakfast  polyethylene glycol 3350 17 Gram(s) Oral at bedtime  senna 2 Tablet(s) Oral at bedtime    MEDICATIONS  (PRN):  aluminum hydroxide/magnesium hydroxide/simethicone Suspension 30 milliLiter(s) Oral every 4 hours PRN Dyspepsia  magnesium hydroxide Suspension 30 milliLiter(s) Oral daily PRN Constipation  melatonin 5 milliGRAM(s) Oral at bedtime PRN Sleep  ondansetron Injectable 4 milliGRAM(s) IV Push every 6 hours PRN Nausea and/or Vomiting  oxyCODONE    IR 10 milliGRAM(s) Oral every 4 hours PRN Severe Pain (7 - 10)  oxyCODONE    IR 5 milliGRAM(s) Oral every 4 hours PRN Moderate Pain (4 - 6)  simethicone 80 milliGRAM(s) Chew every 8 hours PRN Heartburn      ALLERGIES:  Allergies    No Known Allergies    Intolerances        LABS:                        8.0    7.87  )-----------( 314      ( 01 Jun 2023 06:23 )             26.1     06-01    142  |  105  |  10  ----------------------------<  104<H>  4.2   |  25  |  0.72    Ca    7.7<L>      01 Jun 2023 06:23            RADIOLOGY & ADDITIONAL TESTS: Reviewed.

## 2023-06-02 VITALS
SYSTOLIC BLOOD PRESSURE: 121 MMHG | TEMPERATURE: 98 F | DIASTOLIC BLOOD PRESSURE: 77 MMHG | RESPIRATION RATE: 17 BRPM | OXYGEN SATURATION: 99 % | HEART RATE: 81 BPM

## 2023-06-02 LAB
ANION GAP SERPL CALC-SCNC: 9 MMOL/L — SIGNIFICANT CHANGE UP (ref 5–17)
BUN SERPL-MCNC: 10 MG/DL — SIGNIFICANT CHANGE UP (ref 7–23)
CALCIUM SERPL-MCNC: 7.8 MG/DL — LOW (ref 8.4–10.5)
CHLORIDE SERPL-SCNC: 106 MMOL/L — SIGNIFICANT CHANGE UP (ref 96–108)
CO2 SERPL-SCNC: 24 MMOL/L — SIGNIFICANT CHANGE UP (ref 22–31)
CREAT SERPL-MCNC: 0.75 MG/DL — SIGNIFICANT CHANGE UP (ref 0.5–1.3)
EGFR: 92 ML/MIN/1.73M2 — SIGNIFICANT CHANGE UP
GLUCOSE SERPL-MCNC: 99 MG/DL — SIGNIFICANT CHANGE UP (ref 70–99)
HCT VFR BLD CALC: 24.4 % — LOW (ref 34.5–45)
HGB BLD-MCNC: 7.3 G/DL — LOW (ref 11.5–15.5)
MCHC RBC-ENTMCNC: 29.9 GM/DL — LOW (ref 32–36)
MCHC RBC-ENTMCNC: 30 PG — SIGNIFICANT CHANGE UP (ref 27–34)
MCV RBC AUTO: 100.4 FL — HIGH (ref 80–100)
NRBC # BLD: 0 /100 WBCS — SIGNIFICANT CHANGE UP (ref 0–0)
PLATELET # BLD AUTO: 292 K/UL — SIGNIFICANT CHANGE UP (ref 150–400)
POTASSIUM SERPL-MCNC: 4.3 MMOL/L — SIGNIFICANT CHANGE UP (ref 3.5–5.3)
POTASSIUM SERPL-SCNC: 4.3 MMOL/L — SIGNIFICANT CHANGE UP (ref 3.5–5.3)
RBC # BLD: 2.43 M/UL — LOW (ref 3.8–5.2)
RBC # FLD: 13.9 % — SIGNIFICANT CHANGE UP (ref 10.3–14.5)
SODIUM SERPL-SCNC: 139 MMOL/L — SIGNIFICANT CHANGE UP (ref 135–145)
WBC # BLD: 6.64 K/UL — SIGNIFICANT CHANGE UP (ref 3.8–10.5)
WBC # FLD AUTO: 6.64 K/UL — SIGNIFICANT CHANGE UP (ref 3.8–10.5)

## 2023-06-02 PROCEDURE — 86901 BLOOD TYPING SEROLOGIC RH(D): CPT

## 2023-06-02 PROCEDURE — 76000 FLUOROSCOPY <1 HR PHYS/QHP: CPT

## 2023-06-02 PROCEDURE — 86850 RBC ANTIBODY SCREEN: CPT

## 2023-06-02 PROCEDURE — 83970 ASSAY OF PARATHORMONE: CPT

## 2023-06-02 PROCEDURE — 99285 EMERGENCY DEPT VISIT HI MDM: CPT

## 2023-06-02 PROCEDURE — 97535 SELF CARE MNGMENT TRAINING: CPT

## 2023-06-02 PROCEDURE — 97530 THERAPEUTIC ACTIVITIES: CPT

## 2023-06-02 PROCEDURE — 82330 ASSAY OF CALCIUM: CPT

## 2023-06-02 PROCEDURE — 96376 TX/PRO/DX INJ SAME DRUG ADON: CPT

## 2023-06-02 PROCEDURE — 82803 BLOOD GASES ANY COMBINATION: CPT

## 2023-06-02 PROCEDURE — 84132 ASSAY OF SERUM POTASSIUM: CPT

## 2023-06-02 PROCEDURE — 96374 THER/PROPH/DIAG INJ IV PUSH: CPT

## 2023-06-02 PROCEDURE — 84443 ASSAY THYROID STIM HORMONE: CPT

## 2023-06-02 PROCEDURE — 97116 GAIT TRAINING THERAPY: CPT

## 2023-06-02 PROCEDURE — C1713: CPT

## 2023-06-02 PROCEDURE — 85018 HEMOGLOBIN: CPT

## 2023-06-02 PROCEDURE — 84436 ASSAY OF TOTAL THYROXINE: CPT

## 2023-06-02 PROCEDURE — 73501 X-RAY EXAM HIP UNI 1 VIEW: CPT

## 2023-06-02 PROCEDURE — 73551 X-RAY EXAM OF FEMUR 1: CPT

## 2023-06-02 PROCEDURE — 36415 COLL VENOUS BLD VENIPUNCTURE: CPT

## 2023-06-02 PROCEDURE — C1889: CPT

## 2023-06-02 PROCEDURE — 82306 VITAMIN D 25 HYDROXY: CPT

## 2023-06-02 PROCEDURE — 73552 X-RAY EXAM OF FEMUR 2/>: CPT

## 2023-06-02 PROCEDURE — 82652 VIT D 1 25-DIHYDROXY: CPT

## 2023-06-02 PROCEDURE — 85025 COMPLETE CBC W/AUTO DIFF WBC: CPT

## 2023-06-02 PROCEDURE — 80048 BASIC METABOLIC PNL TOTAL CA: CPT

## 2023-06-02 PROCEDURE — 85027 COMPLETE CBC AUTOMATED: CPT

## 2023-06-02 PROCEDURE — 96375 TX/PRO/DX INJ NEW DRUG ADDON: CPT

## 2023-06-02 PROCEDURE — 82947 ASSAY GLUCOSE BLOOD QUANT: CPT

## 2023-06-02 PROCEDURE — C1769: CPT

## 2023-06-02 PROCEDURE — 84100 ASSAY OF PHOSPHORUS: CPT

## 2023-06-02 PROCEDURE — 82310 ASSAY OF CALCIUM: CPT

## 2023-06-02 PROCEDURE — 86900 BLOOD TYPING SEROLOGIC ABO: CPT

## 2023-06-02 PROCEDURE — 97165 OT EVAL LOW COMPLEX 30 MIN: CPT

## 2023-06-02 PROCEDURE — 97161 PT EVAL LOW COMPLEX 20 MIN: CPT

## 2023-06-02 PROCEDURE — 86923 COMPATIBILITY TEST ELECTRIC: CPT

## 2023-06-02 PROCEDURE — 84702 CHORIONIC GONADOTROPIN TEST: CPT

## 2023-06-02 PROCEDURE — 84484 ASSAY OF TROPONIN QUANT: CPT

## 2023-06-02 PROCEDURE — 73560 X-RAY EXAM OF KNEE 1 OR 2: CPT

## 2023-06-02 PROCEDURE — 84295 ASSAY OF SERUM SODIUM: CPT

## 2023-06-02 PROCEDURE — 83735 ASSAY OF MAGNESIUM: CPT

## 2023-06-02 PROCEDURE — 71045 X-RAY EXAM CHEST 1 VIEW: CPT

## 2023-06-02 RX ORDER — ESCITALOPRAM OXALATE 10 MG/1
1 TABLET, FILM COATED ORAL
Refills: 0 | DISCHARGE

## 2023-06-02 RX ORDER — SENNA PLUS 8.6 MG/1
2 TABLET ORAL
Qty: 0 | Refills: 0 | DISCHARGE
Start: 2023-06-02

## 2023-06-02 RX ORDER — PANTOPRAZOLE SODIUM 20 MG/1
1 TABLET, DELAYED RELEASE ORAL
Qty: 30 | Refills: 0
Start: 2023-06-02 | End: 2023-07-01

## 2023-06-02 RX ORDER — ACETAMINOPHEN 500 MG
3 TABLET ORAL
Qty: 0 | Refills: 0 | DISCHARGE
Start: 2023-06-02

## 2023-06-02 RX ORDER — GABAPENTIN 400 MG/1
1 CAPSULE ORAL
Refills: 0 | DISCHARGE

## 2023-06-02 RX ORDER — ASPIRIN/CALCIUM CARB/MAGNESIUM 324 MG
1 TABLET ORAL
Qty: 60 | Refills: 0
Start: 2023-06-02 | End: 2023-07-01

## 2023-06-02 RX ORDER — OXYCODONE HYDROCHLORIDE 5 MG/1
1 TABLET ORAL
Qty: 28 | Refills: 0
Start: 2023-06-02 | End: 2023-06-08

## 2023-06-02 RX ORDER — GABAPENTIN 400 MG/1
1 CAPSULE ORAL
Qty: 0 | Refills: 0 | DISCHARGE
Start: 2023-06-02

## 2023-06-02 RX ORDER — ESCITALOPRAM OXALATE 10 MG/1
1 TABLET, FILM COATED ORAL
Qty: 0 | Refills: 0 | DISCHARGE
Start: 2023-06-02

## 2023-06-02 RX ADMIN — Medication 325 MILLIGRAM(S): at 06:15

## 2023-06-02 RX ADMIN — Medication 975 MILLIGRAM(S): at 13:50

## 2023-06-02 RX ADMIN — OXYCODONE HYDROCHLORIDE 5 MILLIGRAM(S): 5 TABLET ORAL at 13:44

## 2023-06-02 RX ADMIN — ESCITALOPRAM OXALATE 20 MILLIGRAM(S): 10 TABLET, FILM COATED ORAL at 11:02

## 2023-06-02 RX ADMIN — OXYCODONE HYDROCHLORIDE 5 MILLIGRAM(S): 5 TABLET ORAL at 05:22

## 2023-06-02 RX ADMIN — PANTOPRAZOLE SODIUM 40 MILLIGRAM(S): 20 TABLET, DELAYED RELEASE ORAL at 06:16

## 2023-06-02 RX ADMIN — Medication 1 TABLET(S): at 11:02

## 2023-06-02 RX ADMIN — OXYCODONE HYDROCHLORIDE 5 MILLIGRAM(S): 5 TABLET ORAL at 06:22

## 2023-06-02 RX ADMIN — OXYCODONE HYDROCHLORIDE 5 MILLIGRAM(S): 5 TABLET ORAL at 19:22

## 2023-06-02 RX ADMIN — OXYCODONE HYDROCHLORIDE 5 MILLIGRAM(S): 5 TABLET ORAL at 12:44

## 2023-06-02 RX ADMIN — OXYCODONE HYDROCHLORIDE 5 MILLIGRAM(S): 5 TABLET ORAL at 18:22

## 2023-06-02 RX ADMIN — Medication 325 MILLIGRAM(S): at 18:22

## 2023-06-02 NOTE — DISCHARGE NOTE NURSING/CASE MANAGEMENT/SOCIAL WORK - PATIENT PORTAL LINK FT
You can access the FollowMyHealth Patient Portal offered by [facility] by registering at the following website: [email or web address]. By joining [employer]’s FollowMyHealth portal, you will also be able to view your health information using other applications (apps) compatible with our system.

## 2023-06-02 NOTE — DISCHARGE NOTE NURSING/CASE MANAGEMENT/SOCIAL WORK - NSDCPEFALRISK_GEN_ALL_CORE
For information on Fall & Injury Prevention, visit: [email or web address] OR  [email or web address] OR  https://www.cdc.gov/steadi/patient.html

## 2023-06-02 NOTE — PROGRESS NOTE ADULT - SUBJECTIVE AND OBJECTIVE BOX
Ortho AM Progress Note    Seen at bedside this AM. No issues overnight. Pain well controlled. Ready to clear PT and go home.   Denies CP, SOB, N/V, numbness/tingling     Vital Signs Last 24 Hrs  T(C): 36.9 (02 Jun 2023 05:20), Max: 36.9 (02 Jun 2023 05:20)  T(F): 98.4 (02 Jun 2023 05:20), Max: 98.4 (02 Jun 2023 05:20)  HR: 60 (02 Jun 2023 05:20) (60 - 67)  BP: 122/80 (02 Jun 2023 05:20) (113/70 - 132/80)  BP(mean): --  RR: 17 (02 Jun 2023 05:20) (17 - 18)  SpO2: 98% (02 Jun 2023 05:20) (94% - 98%)    Parameters below as of 02 Jun 2023 05:20  Patient On (Oxygen Delivery Method): room air      General: Pt Alert and oriented, NAD  DSG Right hip with YINA x 2, proximal with mild-moderate saturation  Pulses intact RLE  Sensation intact RLE  Motor: EHL/FHL/TA/GS 5/5 RLE        A/P: 57yFemale POD#9 s/p Right femur IMN  - Stable  - Pain Control  - DVT ppx: asa  - PT, WBS: PWB RLE    Ortho Pager [phone]

## 2023-06-02 NOTE — PROGRESS NOTE ADULT - PROVIDER SPECIALTY LIST ADULT
Internal Medicine
Orthopedics
Hospitalist
Hospitalist
Internal Medicine
Orthopedics
Hospitalist
Internal Medicine
Orthopedics
Internal Medicine
Internal Medicine

## 2023-06-02 NOTE — DISCHARGE NOTE NURSING/CASE MANAGEMENT/SOCIAL WORK - HISTORY OF COVID-19 VACCINATION
Physical Therapy Daily Progress Note      Patient: Sara Solis   : 1942  Referring practitioner: DEBORAH Box  Date of Initial Visit: Type: THERAPY  Noted: 2020  Today's Date: 2020  Patient seen for 6 sessions    Recert due:  20  MD followup:  TBD     Sara Solis reports:   Subjective Questionnaire:       Subjective   APRN office called about MRI she had yesterday and they want her to schedule appt with APRN to discuss results.    Pre RX pain 3/10.     Objective  LL=; pelvis level    See Exercise, Manual, and Modality Logs for complete treatment.       Assessment & Plan     Assessment  Assessment details: Pt presented with good pelvic alignment.  Tolerated therex well & added new exercises.  Post RX pain remained same.     Plan  Duration in visits: 16  Plan details: Mini squats, standing hip 3 way        Visit Diagnoses:    ICD-10-CM ICD-9-CM   1. Greater trochanteric bursitis of right hip M70.61 726.5   2. Right hip pain M25.551 719.45   3. Osteophyte of vertebrae M25.78 721.8   4. Primary osteoarthritis of right knee M17.11 715.16                  Timed:  Manual Therapy:         mins  62183;  Therapeutic Exercise:    43     mins  52824;     Neuromuscular Rafaela:        mins  95896;    Therapeutic Activity:          mins  97185;     Gait Training:           mins  59020;     Ultrasound:          mins  36103;    Electrical Stimulation:         mins  31018 ( );    Untimed:  Electrical Stimulation:         mins  64572 ( );  Mechanical Traction:         mins  74822;     Timed Treatment:  43  Total Treatment:    43  mins  Debra Luque PTA  Physical Therapist                   Yes

## 2023-06-02 NOTE — PROGRESS NOTE ADULT - SUBJECTIVE AND OBJECTIVE BOX
Ortho Note    Subjective:  Pt comfortable without complaints, pain controlled with current pain medication regimen.   Denies CP, SOB, N/V, numbness/tingling   Reviewed plan of care with patient at bedside    Vital Signs Last 24 Hrs  T(C): 37.1 (06-02-23 @ 08:32), Max: 37.1 (06-02-23 @ 08:32)  T(F): 98.7 (06-02-23 @ 08:32), Max: 98.7 (06-02-23 @ 08:32)  HR: 87 (06-02-23 @ 08:32) (60 - 87)  BP: 143/76 (06-02-23 @ 08:32) (122/80 - 143/76)  BP(mean): --  RR: 18 (06-02-23 @ 08:32) (17 - 18)  SpO2: 96% (06-02-23 @ 08:32) (96% - 98%)  AVSS    Objective:    Physical Exam:  General: Pt Alert and oriented, NAD  Right HIp YINA DSG C/D/I  Pulses: +2 pedal pulses, wwp toes   Sensation: silt intact bilateral lower extremities  Motor: EHL/FHL/TA/GS- 5/5 bilateral lower extremities          Plan of Care:  A/P: 57yFemale POD#9 s/p Right femur IMN   - afebrile  - Pain Control- Oxycodone 5-10mg PO Q4h prn moderate to severe pain, gabapentin 800mg PO at bedtime, tylenol 975mg Po Q8h,   - DVT ppx: aspirin 325mg PO BID  - PT, WBS: PWB RLE  - appreciate medicine recs  - ambulate with PT as tolerated  - bowel regimen, IS use, PPI  - HGB 7.3- asymptomatic, continue to trend  - Dispo- dual plan home versus [facility]     Ortho Pager [phone]

## 2023-06-08 DIAGNOSIS — Z79.82 LONG TERM (CURRENT) USE OF ASPIRIN: ICD-10-CM

## 2023-06-08 DIAGNOSIS — Z96.651 PRESENCE OF RIGHT ARTIFICIAL KNEE JOINT: ICD-10-CM
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
## 2023-06-08 DIAGNOSIS — D62 ACUTE POSTHEMORRHAGIC ANEMIA: ICD-10-CM

## 2023-06-08 DIAGNOSIS — K21.9 GASTRO-ESOPHAGEAL REFLUX DISEASE WITHOUT ESOPHAGITIS: ICD-10-CM

## 2023-06-08 DIAGNOSIS — F32.A DEPRESSION, UNSPECIFIED: ICD-10-CM

## 2023-06-08 DIAGNOSIS — E66.9 OBESITY, UNSPECIFIED: ICD-10-CM

## 2023-06-08 DIAGNOSIS — S72.301A UNSPECIFIED FRACTURE OF SHAFT OF RIGHT FEMUR, INITIAL ENCOUNTER FOR CLOSED FRACTURE: ICD-10-CM

## 2023-06-08 DIAGNOSIS — E78.5 HYPERLIPIDEMIA, UNSPECIFIED: ICD-10-CM
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
## 2023-06-08 NOTE — ED ADULT NURSE NOTE - NURSING NEURO LEVEL OF CONSCIOUSNESS
Atenolol 25 mg by mouth daily   Chlorthalidone 25 mg by mouth daily    alert and awake/follows commands

## 2023-12-08 NOTE — OCCUPATIONAL THERAPY INITIAL EVALUATION ADULT - PHYSICAL ASSIST/NONPHYSICAL ASSIST: SUPINE/SIT, REHAB EVAL
Attempted to contact patient. Left message.    ----- Message from Bren Woods sent at 12/8/2023 12:56 PM CST -----  Pt has not met deductible for ins for this year the cost of the ult sd is over $500 she would like to know what Dr Marcus would advise while patient may get a supplemental ins for next year .  Please call to advise  [phone]     
verbal cues/nonverbal cues (demo/gestures)/2 person assist

## 2024-03-04 NOTE — ED PROVIDER NOTE - WR ORDER STATUS 3
Patient here for kidney transplant follow up with CHELSEA Vazquez. Offers no complaints. Allergies and medications verbally reviewed, assessment per PA. Completed pillbox fill prior to appointment, had double dose of valcyte in 2 days as was already had in box, corrected. Staples removed per PA, steri strips applied.   VS form home as below:  Weight:168-171  BP:127-136/70s, HR   I & O:2.2-2.5 L/800-1000 ml, not all recorded  Incision:staples itching  Pain:no concerns  Appetite:good  Activity and sleep:Wakes 3 times to void overnight     Resulted

## (undated) DEVICE — VENODYNE/SCD SLEEVE CALF MEDIUM

## (undated) DEVICE — DRAPE CAMERA MINI 3D

## (undated) DEVICE — DRAPE SHOWER CURTAIN ISOLATION

## (undated) DEVICE — ELCTR BOVIE TIP BLADE MEGADYNE E-Z CLEAN 4" EXTENDED

## (undated) DEVICE — MAKO VIZADISC KNEE TRACKING KIT

## (undated) DEVICE — PACK BASIC GOWN MAYO COVER

## (undated) DEVICE — DRAPE U POLY BLUE 60X72"

## (undated) DEVICE — SUT VICRYL 3-0 18" PS-1 UNDYED

## (undated) DEVICE — DRSG COBAN 6"

## (undated) DEVICE — MAKO BALL BUR

## (undated) DEVICE — MAKO DRAPE KIT

## (undated) DEVICE — MAKO CHECKPOINT KIT FEMORAL / TIBIAL

## (undated) DEVICE — MAKO BLADE NARROW

## (undated) DEVICE — DRSG PICO NPWT 4X8"

## (undated) DEVICE — SUT STRATAFIX SYMMETRIC PDS 1 45CM OS-6

## (undated) DEVICE — DRAPE LIGHT HANDLE COVER (GREEN)

## (undated) DEVICE — DRAPE SPLIT SHEET 77" X 108"

## (undated) DEVICE — SPECIMEN CONTAINER 4OZ

## (undated) DEVICE — GLV 8 PROTEXIS (WHITE)

## (undated) DEVICE — FEE LOANER INTELLIJOINT HIP LATERAL

## (undated) DEVICE — SAW BLADE STRYKER SAGITTAL DUAL CUT TEETH 35X64X.64MM

## (undated) DEVICE — POSITIONER FOAM EGG CRATE ULNAR 2PCS (PINK)

## (undated) DEVICE — SUT STRATAFIX SPIRAL PDO 1 30CM OS-6

## (undated) DEVICE — DRAPE C ARM 41X74"

## (undated) DEVICE — ELCTR AQUAMANTYS BIPOLAR SEALER 6.0

## (undated) DEVICE — DRILL BIT STRYKER ORTHO 4.2X80MM

## (undated) DEVICE — SUT STRATAFIX SPIRAL MONOCRYL PLUS 3-0 30CM PS-1 UNDYED

## (undated) DEVICE — GLV 7.5 PROTEXIS (WHITE)

## (undated) DEVICE — PACK TOTAL KNEE

## (undated) DEVICE — SPHERE STERILE

## (undated) DEVICE — SYR CATH TIP 2 OZ

## (undated) DEVICE — SUT VICRYL 1 27" OS-8 UNDYED

## (undated) DEVICE — DRILL BIT STRYKER ORTHO LOKG 4.2X360MM

## (undated) DEVICE — DRAPE LIGHT HANDLE COVER (BLUE)

## (undated) DEVICE — SUT ETHILON 3-0 18" PS-1

## (undated) DEVICE — WARMING BLANKET UPPER ADULT

## (undated) DEVICE — DRSG PICO NPWT 4X12"

## (undated) DEVICE — CLIP IRRIGATION MICS STRL

## (undated) DEVICE — DRSG AQUACEL 3.5 X 10"

## (undated) DEVICE — MARKING PEN W RULER

## (undated) DEVICE — SUT VICRYL 2-0 27" CT-1 UNDYED

## (undated) DEVICE — MAKO BLADE STANDARD

## (undated) DEVICE — REAMER STRYKER ORTHO SHAFT MOD TRINKLE

## (undated) DEVICE — DRSG STOCKINETTE IMPERVIOUS XL

## (undated) DEVICE — POSITIONER LEG WRAP STERILE

## (undated) DEVICE — SUT VICRYL 2-0 27" CT-1